# Patient Record
Sex: FEMALE | Race: OTHER | HISPANIC OR LATINO
[De-identification: names, ages, dates, MRNs, and addresses within clinical notes are randomized per-mention and may not be internally consistent; named-entity substitution may affect disease eponyms.]

---

## 2017-01-04 ENCOUNTER — APPOINTMENT (OUTPATIENT)
Dept: HEART AND VASCULAR | Facility: CLINIC | Age: 47
End: 2017-01-04

## 2020-12-04 VITALS
TEMPERATURE: 98 F | HEIGHT: 60 IN | DIASTOLIC BLOOD PRESSURE: 80 MMHG | HEART RATE: 89 BPM | RESPIRATION RATE: 16 BRPM | WEIGHT: 167.33 LBS | OXYGEN SATURATION: 98 % | SYSTOLIC BLOOD PRESSURE: 116 MMHG

## 2020-12-06 ENCOUNTER — TRANSCRIPTION ENCOUNTER (OUTPATIENT)
Age: 50
End: 2020-12-06

## 2020-12-06 RX ORDER — POVIDONE-IODINE 5 %
1 AEROSOL (ML) TOPICAL ONCE
Refills: 0 | Status: COMPLETED | OUTPATIENT
Start: 2020-12-07 | End: 2020-12-07

## 2020-12-07 ENCOUNTER — INPATIENT (INPATIENT)
Facility: HOSPITAL | Age: 50
LOS: 4 days | Discharge: HOME | DRG: 454 | End: 2020-12-12
Attending: NEUROLOGICAL SURGERY | Admitting: NEUROLOGICAL SURGERY
Payer: COMMERCIAL

## 2020-12-07 DIAGNOSIS — Z41.9 ENCOUNTER FOR PROCEDURE FOR PURPOSES OTHER THAN REMEDYING HEALTH STATE, UNSPECIFIED: Chronic | ICD-10-CM

## 2020-12-07 DIAGNOSIS — M54.12 RADICULOPATHY, CERVICAL REGION: ICD-10-CM

## 2020-12-07 LAB
ANION GAP SERPL CALC-SCNC: 11 MMOL/L — SIGNIFICANT CHANGE UP (ref 5–17)
BLD GP AB SCN SERPL QL: NEGATIVE — SIGNIFICANT CHANGE UP
BUN SERPL-MCNC: 10 MG/DL — SIGNIFICANT CHANGE UP (ref 7–23)
CALCIUM SERPL-MCNC: 8.8 MG/DL — SIGNIFICANT CHANGE UP (ref 8.4–10.5)
CHLORIDE SERPL-SCNC: 102 MMOL/L — SIGNIFICANT CHANGE UP (ref 96–108)
CO2 SERPL-SCNC: 23 MMOL/L — SIGNIFICANT CHANGE UP (ref 22–31)
CREAT SERPL-MCNC: 0.65 MG/DL — SIGNIFICANT CHANGE UP (ref 0.5–1.3)
GLUCOSE SERPL-MCNC: 188 MG/DL — HIGH (ref 70–99)
HCG SERPL-ACNC: 4 MIU/ML — SIGNIFICANT CHANGE UP
POTASSIUM SERPL-MCNC: 4 MMOL/L — SIGNIFICANT CHANGE UP (ref 3.5–5.3)
POTASSIUM SERPL-SCNC: 4 MMOL/L — SIGNIFICANT CHANGE UP (ref 3.5–5.3)
RH IG SCN BLD-IMP: POSITIVE — SIGNIFICANT CHANGE UP
SODIUM SERPL-SCNC: 136 MMOL/L — SIGNIFICANT CHANGE UP (ref 135–145)

## 2020-12-07 PROCEDURE — 99024 POSTOP FOLLOW-UP VISIT: CPT

## 2020-12-07 RX ORDER — POTASSIUM CHLORIDE 20 MEQ
15 PACKET (EA) ORAL
Qty: 0 | Refills: 0 | DISCHARGE

## 2020-12-07 RX ORDER — OXYCODONE HYDROCHLORIDE 5 MG/1
5 TABLET ORAL EVERY 4 HOURS
Refills: 0 | Status: DISCONTINUED | OUTPATIENT
Start: 2020-12-07 | End: 2020-12-08

## 2020-12-07 RX ORDER — AMLODIPINE BESYLATE 2.5 MG/1
5 TABLET ORAL DAILY
Refills: 0 | Status: DISCONTINUED | OUTPATIENT
Start: 2020-12-07 | End: 2020-12-09

## 2020-12-07 RX ORDER — PROPOFOL 10 MG/ML
150 INJECTION, EMULSION INTRAVENOUS
Qty: 1000 | Refills: 0 | Status: DISCONTINUED | OUTPATIENT
Start: 2020-12-07 | End: 2020-12-07

## 2020-12-07 RX ORDER — ACETAMINOPHEN 500 MG
1000 TABLET ORAL EVERY 8 HOURS
Refills: 0 | Status: DISCONTINUED | OUTPATIENT
Start: 2020-12-07 | End: 2020-12-09

## 2020-12-07 RX ORDER — APREPITANT 80 MG/1
40 CAPSULE ORAL ONCE
Refills: 0 | Status: COMPLETED | OUTPATIENT
Start: 2020-12-07 | End: 2020-12-07

## 2020-12-07 RX ORDER — CHLORHEXIDINE GLUCONATE 213 G/1000ML
1 SOLUTION TOPICAL ONCE
Refills: 0 | Status: DISCONTINUED | OUTPATIENT
Start: 2020-12-07 | End: 2020-12-07

## 2020-12-07 RX ORDER — AMLODIPINE BESYLATE 2.5 MG/1
1 TABLET ORAL
Qty: 0 | Refills: 0 | DISCHARGE

## 2020-12-07 RX ORDER — SODIUM CHLORIDE 9 MG/ML
1000 INJECTION INTRAMUSCULAR; INTRAVENOUS; SUBCUTANEOUS
Refills: 0 | Status: DISCONTINUED | OUTPATIENT
Start: 2020-12-07 | End: 2020-12-08

## 2020-12-07 RX ORDER — BUPIVACAINE 13.3 MG/ML
20 INJECTION, SUSPENSION, LIPOSOMAL INFILTRATION ONCE
Refills: 0 | Status: DISCONTINUED | OUTPATIENT
Start: 2020-12-07 | End: 2020-12-09

## 2020-12-07 RX ORDER — ALBUTEROL 90 UG/1
2 AEROSOL, METERED ORAL
Qty: 0 | Refills: 0 | DISCHARGE

## 2020-12-07 RX ORDER — METOCLOPRAMIDE HCL 10 MG
10 TABLET ORAL EVERY 8 HOURS
Refills: 0 | Status: DISCONTINUED | OUTPATIENT
Start: 2020-12-07 | End: 2020-12-09

## 2020-12-07 RX ORDER — FLUTICASONE PROPIONATE AND SALMETEROL 50; 250 UG/1; UG/1
1 POWDER ORAL; RESPIRATORY (INHALATION)
Qty: 0 | Refills: 0 | DISCHARGE

## 2020-12-07 RX ORDER — HYDROMORPHONE HYDROCHLORIDE 2 MG/ML
0.5 INJECTION INTRAMUSCULAR; INTRAVENOUS; SUBCUTANEOUS
Refills: 0 | Status: DISCONTINUED | OUTPATIENT
Start: 2020-12-07 | End: 2020-12-07

## 2020-12-07 RX ORDER — GABAPENTIN 400 MG/1
300 CAPSULE ORAL ONCE
Refills: 0 | Status: COMPLETED | OUTPATIENT
Start: 2020-12-07 | End: 2020-12-07

## 2020-12-07 RX ORDER — ACETAMINOPHEN 500 MG
1000 TABLET ORAL ONCE
Refills: 0 | Status: COMPLETED | OUTPATIENT
Start: 2020-12-07 | End: 2020-12-07

## 2020-12-07 RX ORDER — METHOCARBAMOL 500 MG/1
500 TABLET, FILM COATED ORAL EVERY 8 HOURS
Refills: 0 | Status: DISCONTINUED | OUTPATIENT
Start: 2020-12-07 | End: 2020-12-09

## 2020-12-07 RX ORDER — HYDROMORPHONE HYDROCHLORIDE 2 MG/ML
0.5 INJECTION INTRAMUSCULAR; INTRAVENOUS; SUBCUTANEOUS
Refills: 0 | Status: DISCONTINUED | OUTPATIENT
Start: 2020-12-07 | End: 2020-12-09

## 2020-12-07 RX ORDER — ONDANSETRON 8 MG/1
4 TABLET, FILM COATED ORAL EVERY 6 HOURS
Refills: 0 | Status: DISCONTINUED | OUTPATIENT
Start: 2020-12-07 | End: 2020-12-09

## 2020-12-07 RX ORDER — SENNA PLUS 8.6 MG/1
2 TABLET ORAL AT BEDTIME
Refills: 0 | Status: DISCONTINUED | OUTPATIENT
Start: 2020-12-07 | End: 2020-12-09

## 2020-12-07 RX ORDER — ENOXAPARIN SODIUM 100 MG/ML
40 INJECTION SUBCUTANEOUS AT BEDTIME
Refills: 0 | Status: DISCONTINUED | OUTPATIENT
Start: 2020-12-08 | End: 2020-12-09

## 2020-12-07 RX ORDER — CELECOXIB 200 MG/1
200 CAPSULE ORAL ONCE
Refills: 0 | Status: COMPLETED | OUTPATIENT
Start: 2020-12-07 | End: 2020-12-07

## 2020-12-07 RX ORDER — ROSUVASTATIN CALCIUM 5 MG/1
1 TABLET ORAL
Qty: 0 | Refills: 0 | DISCHARGE

## 2020-12-07 RX ORDER — CEFAZOLIN SODIUM 1 G
2000 VIAL (EA) INJECTION EVERY 8 HOURS
Refills: 0 | Status: COMPLETED | OUTPATIENT
Start: 2020-12-07 | End: 2020-12-08

## 2020-12-07 RX ORDER — ATORVASTATIN CALCIUM 80 MG/1
20 TABLET, FILM COATED ORAL AT BEDTIME
Refills: 0 | Status: DISCONTINUED | OUTPATIENT
Start: 2020-12-07 | End: 2020-12-09

## 2020-12-07 RX ADMIN — Medication 1 APPLICATION(S): at 08:25

## 2020-12-07 RX ADMIN — HYDROMORPHONE HYDROCHLORIDE 0.5 MILLIGRAM(S): 2 INJECTION INTRAMUSCULAR; INTRAVENOUS; SUBCUTANEOUS at 17:40

## 2020-12-07 RX ADMIN — HYDROMORPHONE HYDROCHLORIDE 0.5 MILLIGRAM(S): 2 INJECTION INTRAMUSCULAR; INTRAVENOUS; SUBCUTANEOUS at 16:34

## 2020-12-07 RX ADMIN — ATORVASTATIN CALCIUM 20 MILLIGRAM(S): 80 TABLET, FILM COATED ORAL at 21:19

## 2020-12-07 RX ADMIN — Medication 1000 MILLIGRAM(S): at 08:26

## 2020-12-07 RX ADMIN — Medication 1000 MILLIGRAM(S): at 22:26

## 2020-12-07 RX ADMIN — Medication 1000 MILLIGRAM(S): at 22:00

## 2020-12-07 RX ADMIN — ONDANSETRON 4 MILLIGRAM(S): 8 TABLET, FILM COATED ORAL at 21:00

## 2020-12-07 RX ADMIN — HYDROMORPHONE HYDROCHLORIDE 0.5 MILLIGRAM(S): 2 INJECTION INTRAMUSCULAR; INTRAVENOUS; SUBCUTANEOUS at 16:30

## 2020-12-07 RX ADMIN — Medication 50 MILLIGRAM(S): at 22:23

## 2020-12-07 RX ADMIN — SODIUM CHLORIDE 100 MILLILITER(S): 9 INJECTION INTRAMUSCULAR; INTRAVENOUS; SUBCUTANEOUS at 23:32

## 2020-12-07 RX ADMIN — METHOCARBAMOL 500 MILLIGRAM(S): 500 TABLET, FILM COATED ORAL at 22:23

## 2020-12-07 RX ADMIN — SENNA PLUS 2 TABLET(S): 8.6 TABLET ORAL at 21:19

## 2020-12-07 RX ADMIN — CELECOXIB 200 MILLIGRAM(S): 200 CAPSULE ORAL at 08:56

## 2020-12-07 RX ADMIN — HYDROMORPHONE HYDROCHLORIDE 0.5 MILLIGRAM(S): 2 INJECTION INTRAMUSCULAR; INTRAVENOUS; SUBCUTANEOUS at 23:29

## 2020-12-07 RX ADMIN — HYDROMORPHONE HYDROCHLORIDE 0.5 MILLIGRAM(S): 2 INJECTION INTRAMUSCULAR; INTRAVENOUS; SUBCUTANEOUS at 21:25

## 2020-12-07 RX ADMIN — HYDROMORPHONE HYDROCHLORIDE 0.5 MILLIGRAM(S): 2 INJECTION INTRAMUSCULAR; INTRAVENOUS; SUBCUTANEOUS at 21:45

## 2020-12-07 RX ADMIN — Medication 100 MILLIGRAM(S): at 16:39

## 2020-12-07 RX ADMIN — CELECOXIB 200 MILLIGRAM(S): 200 CAPSULE ORAL at 08:26

## 2020-12-07 RX ADMIN — Medication 1000 MILLIGRAM(S): at 08:56

## 2020-12-07 RX ADMIN — HYDROMORPHONE HYDROCHLORIDE 0.5 MILLIGRAM(S): 2 INJECTION INTRAMUSCULAR; INTRAVENOUS; SUBCUTANEOUS at 16:03

## 2020-12-07 RX ADMIN — GABAPENTIN 300 MILLIGRAM(S): 400 CAPSULE ORAL at 08:27

## 2020-12-07 RX ADMIN — HYDROMORPHONE HYDROCHLORIDE 0.5 MILLIGRAM(S): 2 INJECTION INTRAMUSCULAR; INTRAVENOUS; SUBCUTANEOUS at 20:14

## 2020-12-07 RX ADMIN — APREPITANT 40 MILLIGRAM(S): 80 CAPSULE ORAL at 08:26

## 2020-12-07 NOTE — H&P ADULT - NSICDXPASTMEDICALHX_GEN_ALL_CORE_FT
PAST MEDICAL HISTORY:  Asthma     Cervical radiculopathy     HLD (hyperlipidemia)     HTN (hypertension)

## 2020-12-07 NOTE — H&P ADULT - NSHPREVIEWOFSYSTEMS_GEN_ALL_CORE
REVIEW OF SYSTEMS      General:	no recent illnesses, no recent wt gain/loss    Skin/Breast:  intact  	  Ophthalmologic:  negative  	  ENMT:	negative    Respiratory and Thorax: no coughing, wheezing, recent URI  	  Cardiovascular: no chest pain, LINDQUIST    Gastrointestinal:	soft, non tender    Genitourinary: no frequency, dysuria    Musculoskeletal:	negative    Neurological:	see HPI    Psychiatric:	negative    Hematology/Lymphatics:	negative    Endocrine:  	negative    Allergic/Immunologic:  Negative

## 2020-12-07 NOTE — H&P ADULT - NSHPPHYSICALEXAM_GEN_ALL_CORE
Able to raise both arms overhead but 15 degree lag in left shoulder  4/5 strength throughout left arm deltoid bicep triceps  and intrinsics. full strength right arm.  2+ right 1+ left brachioradialis, trace bilateral triceps reflexes  1+ bilateral patellar  torre negative  tinel negative  clonus negative  spurling uncomfortable  no shoulder impingement signs

## 2020-12-07 NOTE — H&P ADULT - PROBLEM SELECTOR PLAN 1
Surgery as described above and consented Surgery as described above and consented    12/9/20 - revision surgery as consented. Dr. Fonseca will perform plastic closure as well.

## 2020-12-07 NOTE — PROGRESS NOTE ADULT - SUBJECTIVE AND OBJECTIVE BOX
NEUROSURGERY POST OP NOTE:    POD# 0 S/P C6-T1 ACDF, C3-4 Laminectomy, C3-T1 Posterior fusion    S: Pt seen and examined at beside. Patient reports left-sided neck pain, radiating to left arm with paresthesias similar to preop. Pt denies acute worsening weakness of extremities.      T(C): 36.6 (12-07-20 @ 15:36), Max: 36.6 (12-07-20 @ 15:36)  HR: 91 (12-07-20 @ 15:36) (91 - 91)  BP: 114/63 (12-07-20 @ 15:36) (114/63 - 114/63)  RR: --  SpO2: 100% (12-07-20 @ 15:36) (100% - 100%)    acetaminophen   Tablet .. 1000 milliGRAM(s) Oral every 8 hours  amLODIPine   Tablet 5 milliGRAM(s) Oral daily  atorvastatin 20 milliGRAM(s) Oral at bedtime  BUpivacaine liposome 1.3% Injectable (no eMAR) 20 milliLiter(s) Local Injection once  ceFAZolin   IVPB 2000 milliGRAM(s) IV Intermittent every 8 hours  HYDROmorphone  Injectable 0.5 milliGRAM(s) IV Push every 15 minutes PRN  methocarbamol 500 milliGRAM(s) Oral every 8 hours  metoclopramide Injectable 10 milliGRAM(s) IV Push every 8 hours PRN  ondansetron Injectable 4 milliGRAM(s) IV Push every 6 hours  oxyCODONE    IR 5 milliGRAM(s) Oral every 4 hours PRN  pregabalin 50 milliGRAM(s) Oral three times a day  senna 2 Tablet(s) Oral at bedtime  sodium chloride 0.9%. 1000 milliLiter(s) IV Continuous <Continuous>    Exam:  Neuro: AAOx3, FC, speech coherent  CNII-XII: EOM intact, PERRL, face symmetric  Motor: RUE/RLE/LLE 5/5 throughout, LUE 4-/5 throughout and HG 4-/5 (baseline,) decreased sensation to light touch throughout LUE (baseline)  Abdomen: Soft, NT/ND, BSx4  Cardiac: RRR, +s1/s2  Pulm: CTA B/L    WOUND/DRAINS:  anterior and posterior cervical incision sites C/D/I, dressing in place  +left deep hemovac    Assessment: 50yFemale with PMHx sig for HLD, HTN, asthma, cervical radiculopathy s/p C6-T1 ACDF, C3-4 Laminectomy, C3-T1 Posterior fusion    Plan:  -Neuro/spinal checks  -Pain control, ERAS postop: lyrica, tylenol, oxycodone, robaxin  -DVT prophylaxis: SCDs for now  -Postop ancef  -IVF while NPO, ADAT  -F/u AM labs  -Bowel regimen  -Remove seymour tomorrow morning  -PT/OOB  -encourage incentive spirometer use  -D/w Dr. Garrido

## 2020-12-07 NOTE — CHART NOTE - NSCHARTNOTEFT_GEN_A_CORE
Neurosurgical Indications for Screening Dopplers on Admission:       1) Known hypercoagulation disorder (h/o VTE, thrombophilia, HIT, etc.)   2) Admitted from prolonged stay from another institution (straight forward ER transfers not included)  3) Presenting with significant leg immobility   4) Presenting with signs and symptoms of VTE?    5) With significant critical illness, Including "found down" for unknown period of time in HPI  6) With significant neurotrauma (TBI, SCI / TLS spine fractures)   7) Who are comatose   8) With known malignancy (e.g. glioblastoma multiforme, meningioma, etc.). Excludes [place] chemo 23hr observation stays  9) On hemodialysis   10) Who have received platelet transfusion or antithrombotic reversal agents recently   11) Who have had recent major orthopedic surgery          Screening dopplers indicated?   Y _   N X    DVT Prophylaxis:  _ SCD's   X chemoprophylaxis    Lovenox 40mg sq bedtime POD1

## 2020-12-07 NOTE — H&P ADULT - HISTORY OF PRESENT ILLNESS
Patient presents with chronic neck pain that causes headache and migraine type symptoms, pain in both shoulder blades and then pain radiating through the left shoulder arm and down the hand with sense of weakness in left hand, history of C5-6 ACDF and C4-5 acdf 2017. Patient presents with chronic neck pain that causes headache and migraine type symptoms, pain in both shoulder blades and then pain radiating through the left shoulder arm and down the hand with sense of weakness in left hand, history of C5-6 ACDF and C4-5 acdf 2017.    Update: 12/9/2020.     patient complains of persistent increased left arm pain s/p C6-T1 ACDF, C3-4 Laminectomy, C3-T1 Posterior Fusion 12/7/20. It has been decided that she will be brought back to OR for exploration of fusion and reinstrumentation.

## 2020-12-08 ENCOUNTER — TRANSCRIPTION ENCOUNTER (OUTPATIENT)
Age: 50
End: 2020-12-08

## 2020-12-08 LAB
ANION GAP SERPL CALC-SCNC: 10 MMOL/L — SIGNIFICANT CHANGE UP (ref 5–17)
BUN SERPL-MCNC: 12 MG/DL — SIGNIFICANT CHANGE UP (ref 7–23)
CALCIUM SERPL-MCNC: 8.5 MG/DL — SIGNIFICANT CHANGE UP (ref 8.4–10.5)
CHLORIDE SERPL-SCNC: 104 MMOL/L — SIGNIFICANT CHANGE UP (ref 96–108)
CO2 SERPL-SCNC: 23 MMOL/L — SIGNIFICANT CHANGE UP (ref 22–31)
CREAT SERPL-MCNC: 0.69 MG/DL — SIGNIFICANT CHANGE UP (ref 0.5–1.3)
GLUCOSE SERPL-MCNC: 109 MG/DL — HIGH (ref 70–99)
HCT VFR BLD CALC: 33.3 % — LOW (ref 34.5–45)
HGB BLD-MCNC: 11.1 G/DL — LOW (ref 11.5–15.5)
MAGNESIUM SERPL-MCNC: 1.9 MG/DL — SIGNIFICANT CHANGE UP (ref 1.6–2.6)
MCHC RBC-ENTMCNC: 28.8 PG — SIGNIFICANT CHANGE UP (ref 27–34)
MCHC RBC-ENTMCNC: 33.3 GM/DL — SIGNIFICANT CHANGE UP (ref 32–36)
MCV RBC AUTO: 86.3 FL — SIGNIFICANT CHANGE UP (ref 80–100)
NRBC # BLD: 0 /100 WBCS — SIGNIFICANT CHANGE UP (ref 0–0)
PHOSPHATE SERPL-MCNC: 4.9 MG/DL — HIGH (ref 2.5–4.5)
PLATELET # BLD AUTO: 190 K/UL — SIGNIFICANT CHANGE UP (ref 150–400)
POTASSIUM SERPL-MCNC: 4.2 MMOL/L — SIGNIFICANT CHANGE UP (ref 3.5–5.3)
POTASSIUM SERPL-SCNC: 4.2 MMOL/L — SIGNIFICANT CHANGE UP (ref 3.5–5.3)
RBC # BLD: 3.86 M/UL — SIGNIFICANT CHANGE UP (ref 3.8–5.2)
RBC # FLD: 13.2 % — SIGNIFICANT CHANGE UP (ref 10.3–14.5)
SODIUM SERPL-SCNC: 137 MMOL/L — SIGNIFICANT CHANGE UP (ref 135–145)
WBC # BLD: 19.29 K/UL — HIGH (ref 3.8–10.5)
WBC # FLD AUTO: 19.29 K/UL — HIGH (ref 3.8–10.5)

## 2020-12-08 PROCEDURE — 72125 CT NECK SPINE W/O DYE: CPT | Mod: 26

## 2020-12-08 RX ORDER — BUDESONIDE AND FORMOTEROL FUMARATE DIHYDRATE 160; 4.5 UG/1; UG/1
2 AEROSOL RESPIRATORY (INHALATION)
Refills: 0 | Status: DISCONTINUED | OUTPATIENT
Start: 2020-12-08 | End: 2020-12-09

## 2020-12-08 RX ORDER — ALBUTEROL 90 UG/1
2 AEROSOL, METERED ORAL EVERY 6 HOURS
Refills: 0 | Status: DISCONTINUED | OUTPATIENT
Start: 2020-12-08 | End: 2020-12-09

## 2020-12-08 RX ORDER — OXYCODONE HYDROCHLORIDE 5 MG/1
10 TABLET ORAL EVERY 4 HOURS
Refills: 0 | Status: DISCONTINUED | OUTPATIENT
Start: 2020-12-08 | End: 2020-12-09

## 2020-12-08 RX ADMIN — HYDROMORPHONE HYDROCHLORIDE 0.5 MILLIGRAM(S): 2 INJECTION INTRAMUSCULAR; INTRAVENOUS; SUBCUTANEOUS at 07:28

## 2020-12-08 RX ADMIN — HYDROMORPHONE HYDROCHLORIDE 0.5 MILLIGRAM(S): 2 INJECTION INTRAMUSCULAR; INTRAVENOUS; SUBCUTANEOUS at 08:46

## 2020-12-08 RX ADMIN — ONDANSETRON 4 MILLIGRAM(S): 8 TABLET, FILM COATED ORAL at 03:41

## 2020-12-08 RX ADMIN — Medication 1000 MILLIGRAM(S): at 14:52

## 2020-12-08 RX ADMIN — ATORVASTATIN CALCIUM 20 MILLIGRAM(S): 80 TABLET, FILM COATED ORAL at 21:22

## 2020-12-08 RX ADMIN — BUDESONIDE AND FORMOTEROL FUMARATE DIHYDRATE 2 PUFF(S): 160; 4.5 AEROSOL RESPIRATORY (INHALATION) at 09:40

## 2020-12-08 RX ADMIN — ENOXAPARIN SODIUM 40 MILLIGRAM(S): 100 INJECTION SUBCUTANEOUS at 21:22

## 2020-12-08 RX ADMIN — AMLODIPINE BESYLATE 5 MILLIGRAM(S): 2.5 TABLET ORAL at 05:36

## 2020-12-08 RX ADMIN — Medication 1000 MILLIGRAM(S): at 05:36

## 2020-12-08 RX ADMIN — Medication 100 MILLIGRAM(S): at 00:23

## 2020-12-08 RX ADMIN — Medication 1000 MILLIGRAM(S): at 22:22

## 2020-12-08 RX ADMIN — METHOCARBAMOL 500 MILLIGRAM(S): 500 TABLET, FILM COATED ORAL at 05:36

## 2020-12-08 RX ADMIN — OXYCODONE HYDROCHLORIDE 10 MILLIGRAM(S): 5 TABLET ORAL at 09:30

## 2020-12-08 RX ADMIN — ALBUTEROL 2 PUFF(S): 90 AEROSOL, METERED ORAL at 09:39

## 2020-12-08 RX ADMIN — Medication 50 MILLIGRAM(S): at 05:37

## 2020-12-08 RX ADMIN — BUDESONIDE AND FORMOTEROL FUMARATE DIHYDRATE 2 PUFF(S): 160; 4.5 AEROSOL RESPIRATORY (INHALATION) at 17:57

## 2020-12-08 RX ADMIN — OXYCODONE HYDROCHLORIDE 10 MILLIGRAM(S): 5 TABLET ORAL at 17:47

## 2020-12-08 RX ADMIN — Medication 1000 MILLIGRAM(S): at 21:22

## 2020-12-08 RX ADMIN — METHOCARBAMOL 500 MILLIGRAM(S): 500 TABLET, FILM COATED ORAL at 13:34

## 2020-12-08 RX ADMIN — SENNA PLUS 2 TABLET(S): 8.6 TABLET ORAL at 21:22

## 2020-12-08 RX ADMIN — METHOCARBAMOL 500 MILLIGRAM(S): 500 TABLET, FILM COATED ORAL at 23:38

## 2020-12-08 RX ADMIN — Medication 1000 MILLIGRAM(S): at 13:34

## 2020-12-08 RX ADMIN — OXYCODONE HYDROCHLORIDE 10 MILLIGRAM(S): 5 TABLET ORAL at 10:13

## 2020-12-08 RX ADMIN — HYDROMORPHONE HYDROCHLORIDE 0.5 MILLIGRAM(S): 2 INJECTION INTRAMUSCULAR; INTRAVENOUS; SUBCUTANEOUS at 00:08

## 2020-12-08 RX ADMIN — Medication 1000 MILLIGRAM(S): at 05:30

## 2020-12-08 RX ADMIN — Medication 75 MILLIGRAM(S): at 21:22

## 2020-12-08 RX ADMIN — Medication 75 MILLIGRAM(S): at 13:33

## 2020-12-08 RX ADMIN — OXYCODONE HYDROCHLORIDE 10 MILLIGRAM(S): 5 TABLET ORAL at 16:40

## 2020-12-08 NOTE — OCCUPATIONAL THERAPY INITIAL EVALUATION ADULT - PERTINENT HX OF CURRENT PROBLEM, REHAB EVAL
Patient presents with chronic neck pain that causes headache and migraine type symptoms, pain in both shoulder blades and then pain radiating through the left shoulder arm and down the hand with sense of weakness in left hand, history of C5-6 ACDF and C4-5 acdf 2017.

## 2020-12-08 NOTE — PHYSICAL THERAPY INITIAL EVALUATION ADULT - CRITERIA FOR SKILLED THERAPEUTIC INTERVENTIONS
rehab potential/predicted duration of therapy intervention/anticipated equipment needs at discharge/functional limitations in following categories/anticipated discharge recommendation/impairments found/therapy frequency

## 2020-12-08 NOTE — OCCUPATIONAL THERAPY INITIAL EVALUATION ADULT - GENERAL OBSERVATIONS, REHAB EVAL
Spoke with RN Minda prior to session. Pt received semi-supine in bed in NAD, +[place], +medina.

## 2020-12-08 NOTE — PROGRESS NOTE ADULT - SUBJECTIVE AND OBJECTIVE BOX
HPI:  Patient presents with chronic neck pain that causes headache and migraine type symptoms, pain in both shoulder blades and then pain radiating through the left shoulder arm and down the hand with sense of weakness in left hand, history of C5-6 ACDF and C4-5 acdf 2017. (07 Dec 2020 16:33)    OVERNIGHT EVENTS:    Hospital Course:  POD# 0 S/P C6-T1 ACDF, C3-4 Laminectomy, C3-T1 Posterior fusion. Complaints of left shoulder/arm pain with paresthesias.  POD#1: +[place] J collar. Pain Mgmt consult. PT evaluation.    Vital Signs Last 24 Hrs  T(C): 36.6 (08 Dec 2020 05:22), Max: 36.6 (07 Dec 2020 15:36)  T(F): 97.9 (08 Dec 2020 05:22), Max: 97.9 (07 Dec 2020 15:36)  HR: 83 (08 Dec 2020 05:22) (68 - 91)  BP: 109/59 (08 Dec 2020 05:22) (93/58 - 118/61)  BP(mean): 83 (07 Dec 2020 23:00) (71 - 84)  RR: 17 (08 Dec 2020 05:22) (11 - 19)  SpO2: 98% (08 Dec 2020 05:22) (93% - 100%)    I&O's Summary    07 Dec 2020 07:01  -  08 Dec 2020 05:35  --------------------------------------------------------  IN: 1330 mL / OUT: 1017 mL / NET: 313 mL        PHYSICAL EXAM:  Gen: NAD, AAOx3  HEENT: PERRL. EOMI.  Neck: Right anterior and midline posterior incisions C/D/I. +Hard collar. +posterior HMV.   Lungs: Clear b/l  Heart: S1, S2. NSR.  Abd: Soft, NT/ND. +BS  Exts: Pulses 2+ throughout  Neuro: CNs II-XII intact intact. Left UE 4-/5 should abduction, 4+/5 elbow flexion, 4/5 extension, 4/5 . Right UE and LEs 5/5 throughout. Sensation to LT decreased throughout left arm. 1+ hyporeflexic to b/l UEs. Speech clear. Following commands.    TUBES/LINES:  [x] Seymour  [] Lumbar Drain  [x] Wound Drains  [] Others      DIET:  [] NPO  [] Mechanical  [] Tube feeds    LABS:    12-07    136  |  102  |  10  ----------------------------<  188<H>  4.0   |  23  |  0.65    Ca    8.8      07 Dec 2020 17:21              CAPILLARY BLOOD GLUCOSE          Drug Levels: [] N/A    CSF Analysis: [] N/A      Allergies    niacin (Hives)    Intolerances      MEDICATIONS:  Antibiotics:    Neuro:  acetaminophen   Tablet .. 1000 milliGRAM(s) Oral every 8 hours  HYDROmorphone  Injectable 0.5 milliGRAM(s) IV Push every 3 hours PRN  methocarbamol 500 milliGRAM(s) Oral every 8 hours  metoclopramide Injectable 10 milliGRAM(s) IV Push every 8 hours PRN  ondansetron Injectable 4 milliGRAM(s) IV Push every 6 hours  oxyCODONE    IR 5 milliGRAM(s) Oral every 4 hours PRN  pregabalin 50 milliGRAM(s) Oral three times a day    Anticoagulation:  enoxaparin Injectable 40 milliGRAM(s) SubCutaneous at bedtime    OTHER:  amLODIPine   Tablet 5 milliGRAM(s) Oral daily  atorvastatin 20 milliGRAM(s) Oral at bedtime  BUpivacaine liposome 1.3% Injectable (no eMAR) 20 milliLiter(s) Local Injection once  senna 2 Tablet(s) Oral at bedtime    IVF:  sodium chloride 0.9%. 1000 milliLiter(s) IV Continuous <Continuous>    CULTURES:    RADIOLOGY & ADDITIONAL TESTS:      ASSESSMENT:  50y Female with PMH of HLD, HTN, asthma, cervical radiculopathy s/p C6-T1 ACDF, C3-4 Laminectomy, C3-T1 Posterior fusion    : CERVICALGIA    Handoff    MEWS Score    Asthma    Cervical radiculopathy    Back pain    HLD (hyperlipidemia)    HTN (hypertension)    Cervical radiculopathy    Laminectomy, spine, cervical, open    Fusion of cervical spine by combined anterior and posterior approaches    Anterior cervical discectomy and fusion (ACDF)    Surgery, elective    [id number]        PLAN:  NEURO:  Continue neuro checks,  Pain meds PRN, ERAS protocol with Dilaudid breakthrough,  Hard collar when OOB,  Monitor drain output,    CARDIOVASCULAR:  Normotensive BP goals,  Daily labs, electrolyte repletion PRN,  Continue home norvasc and statin medications    PULMONARY:  Saturating well on RA,  Incentive spirometry PRN  Continue Advair. Nebulizer PRN.    RENAL:  Plan to DC seymour for TOV when OOB    GI:  PO diet as tolerated, stop IVF,  Stool softeners    ID:  Afebrile, no antibiotics,    ENDO:  n/a    DVT PROPHYLAXIS:  SCDs, SQL to start tonight    DISPOSITION:  Continue telemetry monitoring,  PT/OT, and OOB as tolerated,  D/w Dr. Garrido    Assessment:  Present when checked    []  GCS  E   V  M     Heart Failure: []Acute, [] acute on chronic , []chronic  Heart Failure:  [] Diastolic (HFpEF), [] Systolic (HFrEF), []Combined (HFpEF and HFrEF), [] RHF, [] Pulm HTN, [] Other    [] CASTRO, [] ATN, [] AIN, [] other  [] CKD1, [] CKD2, [] CKD 3, [] CKD 4, [] CKD 5, []ESRD    Encephalopathy: [] Metabolic, [] Hepatic, [] toxic, [] Neurological, [] Other    Abnormal Nurtitional Status: [] malnurtition (see nutrition note), [ ]underweight: BMI < 19, [] morbid obesity: BMI >40, [] Cachexia    [] Sepsis  [] hypovolemic shock,[] cardiogenic shock, [] hemorrhagic shock, [] neuogenic shock  [] Acute Respiratory Failure  []Cerebral edema, [] Brain compression/ herniation,   [] Functional quadriplegia  [] Acute blood loss anemia  p

## 2020-12-08 NOTE — PHYSICAL THERAPY INITIAL EVALUATION ADULT - ADDITIONAL COMMENTS
Pt lives in a walk up apartment with family and 14 steps to enter. Pt has been independent prior to s w/o use of DME.

## 2020-12-08 NOTE — PHYSICAL THERAPY INITIAL EVALUATION ADULT - PLANNED THERAPY INTERVENTIONS, PT EVAL
bed mobility training/manual therapy techniques/transfer training/gait training/strengthening/balance training

## 2020-12-08 NOTE — PROGRESS NOTE ADULT - ATTENDING COMMENTS
Sitting up in bed, incisional pain moderate, new left shoulder/arm/hand ache  able to raise both arms fully overhead (some discomfort left shoulder)  strong resistance bilat delt bi tri , IP Q  tolerates swallow without difficulty    mobilize PT OT  hard collar when OOB  advance diet as tolerated  drain management

## 2020-12-08 NOTE — PROGRESS NOTE ADULT - SUBJECTIVE AND OBJECTIVE BOX
HPI:  Patient presents with chronic neck pain that causes headache and migraine type symptoms, pain in both shoulder blades and then pain radiating through the left shoulder arm and down the hand with sense of weakness in left hand, history of C5-6 ACDF and C4-5 acdf 2017. (07 Dec 2020 16:33)    OVERNIGHT EVENTS:    Hospital Course:  POD# 0 S/P C6-T1 ACDF, C3-4 Laminectomy, C3-T1 Posterior fusion. Complaints of left shoulder/arm pain with paresthesias.  POD#1: +[place] J collar. Pain Mgmt consult. PT evaluation. Patient complaining of moderate pain.      Vital Signs Last 24 Hrs  T(C): 36.6 (08 Dec 2020 05:22), Max: 36.6 (07 Dec 2020 15:36)  T(F): 97.9 (08 Dec 2020 05:22), Max: 97.9 (07 Dec 2020 15:36)  HR: 83 (08 Dec 2020 05:22) (68 - 91)  BP: 109/59 (08 Dec 2020 05:22) (93/58 - 118/61)  BP(mean): 83 (07 Dec 2020 23:00) (71 - 84)  RR: 17 (08 Dec 2020 05:22) (11 - 19)  SpO2: 98% (08 Dec 2020 05:22) (93% - 100%)      PHYSICAL EXAM:  Gen: NAD, AAOx3, uncomfortable  HEENT: PERRL. EOMI.  Neck: Right anterior and midline posterior incisions C/D/I. +Hard collar. +posterior HMV.   Lungs: Clear b/l  Heart: S1, S2. NSR.  Abd: Soft, NT/ND. +BS  Exts: Pulses 2+ throughout  Neuro: CNs II-XII intact intact. Left UE 4-/5 should abduction, 4+/5 elbow flexion, 4/5 extension, 4/5 . Right UE and LEs 5/5 throughout. Sensation to LT decreased throughout left arm. 1+ hyporeflexic to b/l UEs. Speech clear. Following commands.      Allergies    niacin (Hives)    Intolerances      MEDICATIONS  (STANDING):  acetaminophen   Tablet .. 1000 milliGRAM(s) Oral every 8 hours  amLODIPine   Tablet 5 milliGRAM(s) Oral daily  atorvastatin 20 milliGRAM(s) Oral at bedtime  budesonide 160 MICROgram(s)/formoterol 4.5 MICROgram(s) Inhaler 2 Puff(s) Inhalation two times a day  BUpivacaine liposome 1.3% Injectable (no eMAR) 20 milliLiter(s) Local Injection once  enoxaparin Injectable 40 milliGRAM(s) SubCutaneous at bedtime  methocarbamol 500 milliGRAM(s) Oral every 8 hours  ondansetron Injectable 4 milliGRAM(s) IV Push every 6 hours  pregabalin 50 milliGRAM(s) Oral three times a day  senna 2 Tablet(s) Oral at bedtime    MEDICATIONS  (PRN):  ALBUTerol    90 MICROgram(s) HFA Inhaler 2 Puff(s) Inhalation every 6 hours PRN Shortness of Breath and/or Wheezing  HYDROmorphone  Injectable 0.5 milliGRAM(s) IV Push every 3 hours PRN Breakthrough Pain  metoclopramide Injectable 10 milliGRAM(s) IV Push every 8 hours PRN Nausea  oxyCODONE    IR 5 milliGRAM(s) Oral every 4 hours PRN Severe Pain (7 - 10)      CULTURES:    RADIOLOGY & ADDITIONAL TESTS:      ASSESSMENT:  50y Female with PMH of HLD, HTN, asthma, cervical radiculopathy s/p C6-T1 ACDF, C3-4 Laminectomy, C3-T1 Posterior fusion  - Continue Acetominophen 1000 mg Po q8h  - Continue methocarbamol 500 mg Po q8h  - Increase Pregabalin to 75 mg Po q8h  - Add Oxycodone 10 mg Po q4h prn severe pain (patient was taking Percocet prior to hospital)   - Continue bowel regimen    Plan discussed with patient and team.  All questions answered,  Will continue to follow.

## 2020-12-09 LAB
ANION GAP SERPL CALC-SCNC: 10 MMOL/L — SIGNIFICANT CHANGE UP (ref 5–17)
BUN SERPL-MCNC: 13 MG/DL — SIGNIFICANT CHANGE UP (ref 7–23)
CALCIUM SERPL-MCNC: 9 MG/DL — SIGNIFICANT CHANGE UP (ref 8.4–10.5)
CHLORIDE SERPL-SCNC: 102 MMOL/L — SIGNIFICANT CHANGE UP (ref 96–108)
CO2 SERPL-SCNC: 26 MMOL/L — SIGNIFICANT CHANGE UP (ref 22–31)
CREAT SERPL-MCNC: 0.69 MG/DL — SIGNIFICANT CHANGE UP (ref 0.5–1.3)
GLUCOSE SERPL-MCNC: 94 MG/DL — SIGNIFICANT CHANGE UP (ref 70–99)
HCT VFR BLD CALC: 36.2 % — SIGNIFICANT CHANGE UP (ref 34.5–45)
HGB BLD-MCNC: 11.8 G/DL — SIGNIFICANT CHANGE UP (ref 11.5–15.5)
MAGNESIUM SERPL-MCNC: 2 MG/DL — SIGNIFICANT CHANGE UP (ref 1.6–2.6)
MCHC RBC-ENTMCNC: 29.1 PG — SIGNIFICANT CHANGE UP (ref 27–34)
MCHC RBC-ENTMCNC: 32.6 GM/DL — SIGNIFICANT CHANGE UP (ref 32–36)
MCV RBC AUTO: 89.4 FL — SIGNIFICANT CHANGE UP (ref 80–100)
MELD SCORE WITH DIALYSIS: 7 POINTS — SIGNIFICANT CHANGE UP
NRBC # BLD: 0 /100 WBCS — SIGNIFICANT CHANGE UP (ref 0–0)
PHOSPHATE SERPL-MCNC: 3 MG/DL — SIGNIFICANT CHANGE UP (ref 2.5–4.5)
PLATELET # BLD AUTO: 193 K/UL — SIGNIFICANT CHANGE UP (ref 150–400)
POTASSIUM SERPL-MCNC: 3.9 MMOL/L — SIGNIFICANT CHANGE UP (ref 3.5–5.3)
POTASSIUM SERPL-SCNC: 3.9 MMOL/L — SIGNIFICANT CHANGE UP (ref 3.5–5.3)
RBC # BLD: 4.05 M/UL — SIGNIFICANT CHANGE UP (ref 3.8–5.2)
RBC # FLD: 13.7 % — SIGNIFICANT CHANGE UP (ref 10.3–14.5)
SODIUM SERPL-SCNC: 138 MMOL/L — SIGNIFICANT CHANGE UP (ref 135–145)
WBC # BLD: 13.57 K/UL — HIGH (ref 3.8–10.5)
WBC # FLD AUTO: 13.57 K/UL — HIGH (ref 3.8–10.5)

## 2020-12-09 PROCEDURE — 99024 POSTOP FOLLOW-UP VISIT: CPT

## 2020-12-09 RX ORDER — ENOXAPARIN SODIUM 100 MG/ML
40 INJECTION SUBCUTANEOUS AT BEDTIME
Refills: 0 | Status: DISCONTINUED | OUTPATIENT
Start: 2020-12-10 | End: 2020-12-12

## 2020-12-09 RX ORDER — METHOCARBAMOL 500 MG/1
500 TABLET, FILM COATED ORAL EVERY 8 HOURS
Refills: 0 | Status: DISCONTINUED | OUTPATIENT
Start: 2020-12-09 | End: 2020-12-11

## 2020-12-09 RX ORDER — HYDROMORPHONE HYDROCHLORIDE 2 MG/ML
30 INJECTION INTRAMUSCULAR; INTRAVENOUS; SUBCUTANEOUS
Refills: 0 | Status: DISCONTINUED | OUTPATIENT
Start: 2020-12-09 | End: 2020-12-11

## 2020-12-09 RX ORDER — SODIUM CHLORIDE 9 MG/ML
1000 INJECTION, SOLUTION INTRAVENOUS
Refills: 0 | Status: DISCONTINUED | OUTPATIENT
Start: 2020-12-09 | End: 2020-12-09

## 2020-12-09 RX ORDER — NALOXONE HYDROCHLORIDE 4 MG/.1ML
0.1 SPRAY NASAL
Refills: 0 | Status: DISCONTINUED | OUTPATIENT
Start: 2020-12-09 | End: 2020-12-12

## 2020-12-09 RX ORDER — OXYCODONE HYDROCHLORIDE 5 MG/1
10 TABLET ORAL EVERY 12 HOURS
Refills: 0 | Status: DISCONTINUED | OUTPATIENT
Start: 2020-12-09 | End: 2020-12-09

## 2020-12-09 RX ORDER — SODIUM CHLORIDE 9 MG/ML
1000 INJECTION INTRAMUSCULAR; INTRAVENOUS; SUBCUTANEOUS
Refills: 0 | Status: DISCONTINUED | OUTPATIENT
Start: 2020-12-09 | End: 2020-12-09

## 2020-12-09 RX ORDER — POVIDONE-IODINE 5 %
1 AEROSOL (ML) TOPICAL ONCE
Refills: 0 | Status: COMPLETED | OUTPATIENT
Start: 2020-12-09 | End: 2020-12-09

## 2020-12-09 RX ORDER — CEFAZOLIN SODIUM 1 G
2000 VIAL (EA) INJECTION EVERY 8 HOURS
Refills: 0 | Status: COMPLETED | OUTPATIENT
Start: 2020-12-09 | End: 2020-12-10

## 2020-12-09 RX ORDER — ACETAMINOPHEN 500 MG
1000 TABLET ORAL EVERY 8 HOURS
Refills: 0 | Status: DISCONTINUED | OUTPATIENT
Start: 2020-12-09 | End: 2020-12-12

## 2020-12-09 RX ORDER — HYDROMORPHONE HYDROCHLORIDE 2 MG/ML
0.5 INJECTION INTRAMUSCULAR; INTRAVENOUS; SUBCUTANEOUS
Refills: 0 | Status: ACTIVE | OUTPATIENT
Start: 2020-12-09 | End: 2020-12-16

## 2020-12-09 RX ORDER — OXYCODONE HYDROCHLORIDE 5 MG/1
5 TABLET ORAL EVERY 4 HOURS
Refills: 0 | Status: ACTIVE | OUTPATIENT
Start: 2020-12-09 | End: 2020-12-16

## 2020-12-09 RX ORDER — OXYCODONE HYDROCHLORIDE 5 MG/1
10 TABLET ORAL
Refills: 0 | Status: DISCONTINUED | OUTPATIENT
Start: 2020-12-09 | End: 2020-12-09

## 2020-12-09 RX ORDER — METOCLOPRAMIDE HCL 10 MG
10 TABLET ORAL EVERY 8 HOURS
Refills: 0 | Status: DISCONTINUED | OUTPATIENT
Start: 2020-12-09 | End: 2020-12-12

## 2020-12-09 RX ORDER — ONDANSETRON 8 MG/1
4 TABLET, FILM COATED ORAL EVERY 6 HOURS
Refills: 0 | Status: DISCONTINUED | OUTPATIENT
Start: 2020-12-09 | End: 2020-12-12

## 2020-12-09 RX ORDER — SENNA PLUS 8.6 MG/1
2 TABLET ORAL AT BEDTIME
Refills: 0 | Status: DISCONTINUED | OUTPATIENT
Start: 2020-12-09 | End: 2020-12-12

## 2020-12-09 RX ADMIN — Medication 1000 MILLIGRAM(S): at 23:20

## 2020-12-09 RX ADMIN — Medication 1000 MILLIGRAM(S): at 08:01

## 2020-12-09 RX ADMIN — OXYCODONE HYDROCHLORIDE 10 MILLIGRAM(S): 5 TABLET ORAL at 11:36

## 2020-12-09 RX ADMIN — HYDROMORPHONE HYDROCHLORIDE 30 MILLILITER(S): 2 INJECTION INTRAMUSCULAR; INTRAVENOUS; SUBCUTANEOUS at 19:27

## 2020-12-09 RX ADMIN — OXYCODONE HYDROCHLORIDE 10 MILLIGRAM(S): 5 TABLET ORAL at 12:23

## 2020-12-09 RX ADMIN — METHOCARBAMOL 500 MILLIGRAM(S): 500 TABLET, FILM COATED ORAL at 22:25

## 2020-12-09 RX ADMIN — Medication 100 MILLIGRAM(S): at 22:25

## 2020-12-09 RX ADMIN — Medication 75 MILLIGRAM(S): at 07:03

## 2020-12-09 RX ADMIN — SENNA PLUS 2 TABLET(S): 8.6 TABLET ORAL at 22:25

## 2020-12-09 RX ADMIN — BUDESONIDE AND FORMOTEROL FUMARATE DIHYDRATE 2 PUFF(S): 160; 4.5 AEROSOL RESPIRATORY (INHALATION) at 07:03

## 2020-12-09 RX ADMIN — METHOCARBAMOL 500 MILLIGRAM(S): 500 TABLET, FILM COATED ORAL at 07:03

## 2020-12-09 RX ADMIN — Medication 1 APPLICATION(S): at 13:00

## 2020-12-09 RX ADMIN — Medication 1000 MILLIGRAM(S): at 22:25

## 2020-12-09 RX ADMIN — Medication 50 MILLIGRAM(S): at 22:25

## 2020-12-09 RX ADMIN — SODIUM CHLORIDE 75 MILLILITER(S): 9 INJECTION, SOLUTION INTRAVENOUS at 07:07

## 2020-12-09 RX ADMIN — Medication 1000 MILLIGRAM(S): at 07:03

## 2020-12-09 RX ADMIN — AMLODIPINE BESYLATE 5 MILLIGRAM(S): 2.5 TABLET ORAL at 07:02

## 2020-12-09 NOTE — DIETITIAN INITIAL EVALUATION ADULT. - OTHER INFO
50F with hx of asthma, HLD, HTN, with sx hx of C5-6 ACDF and C4-5 acdf 2017 admitting with chronic neck pain that causes headache and migraine type symptoms, pain in both shoulder blades and then pain radiating through the left shoulder arm and down the hand with sense of weakness in left hand now S/P C6-T1 ACDF, C3-4 Laminectomy, C3-T1 Posterior fusion 12/7. Plan for OR again today for Posterior cervical revision instrumentation.     On assessment, pt resting in bed. Currently NPO for OR today, but was noted to be on CLD and tolerated well (>50%). No noted n/v/d. Last BM 12/6. No abd distention. 5/10 anterior neck pain noted- well controlled with pain regime. Pt noted to have a good appetite PTA. Followed regular diet. UBW consistent with admission weight. NKFA. Edu provided on likely diet advancement s/p sx and importance of adequate PO intake with emphasis on lean protein. Skin: Surgical incision, tanisha score 20. GI: WDL. Please see nutrition recs below. 50F with hx of asthma, HLD, HTN, with sx hx of C5-6 ACDF and C4-5 acdf 2017 admitting with chronic neck pain that causes headache and migraine type symptoms, pain in both shoulder blades and then pain radiating through the left shoulder arm and down the hand with sense of weakness in left hand now S/P C6-T1 ACDF, C3-4 Laminectomy, C3-T1 Posterior fusion 12/7. Plan for OR again today for Posterior cervical revision instrumentation.     On assessment, pt resting in bed. Currently NPO for OR today, but was noted to be on CLD and tolerated well (>50%). No noted n/v/d. Last BM 12/6. No abd distention. 5/10 anterior neck pain noted- well controlled with pain regime. Pt noted to have a good appetite PTA. Followed regular diet. UBW consistent with admission weight. NKFA. Edu provided on likely diet advancement s/p sx and importance of adequate PO intake with emphasis on lean protein. Skin: Surgical incision, tanisha score 20. GI: WDL. Please see nutrition recs below. RD to follow

## 2020-12-09 NOTE — PROGRESS NOTE ADULT - SUBJECTIVE AND OBJECTIVE BOX
NEUROSURGERY POST OP NOTE:    POD# 0 S/P   Exploration and revisional posterior instrumentation of cervical spine with plastic surgery closure (Dr. Fonseca)      S:   Post-op patient complains of posterior cervical pain radiating into her LUE. Patient states that the pain is improved from pre-op. Patient denies any numbness, tingling, or new weakness.     T(C): 37.2 (12-09-20 @ 20:34), Max: 37.2 (12-09-20 @ 20:34)  HR: 83 (12-09-20 @ 20:34) (72 - 88)  BP: 126/64 (12-09-20 @ 20:34) (110/59 - 153/79)  RR: 16 (12-09-20 @ 20:34) (11 - 17)  SpO2: 97% (12-09-20 @ 20:34) (94% - 100%)      12-08-20 @ 07:01  -  12-09-20 @ 07:00  --------------------------------------------------------  IN: 1855 mL / OUT: 1500 mL / NET: 355 mL    12-09-20 @ 07:01  -  12-09-20 @ 21:51  --------------------------------------------------------  IN: 415 mL / OUT: 1512.5 mL / NET: -1097.5 mL        acetaminophen   Tablet .. 1000 milliGRAM(s) Oral every 8 hours  ceFAZolin   IVPB 2000 milliGRAM(s) IV Intermittent every 8 hours  HYDROmorphone  Injectable 0.5 milliGRAM(s) IV Push every 15 minutes PRN  HYDROmorphone PCA (1 mG/mL) 30 milliLiter(s) PCA Continuous PCA Continuous  methocarbamol 500 milliGRAM(s) Oral every 8 hours  metoclopramide Injectable 10 milliGRAM(s) IV Push every 8 hours PRN  naloxone Injectable 0.1 milliGRAM(s) IV Push every 3 minutes PRN  ondansetron Injectable 4 milliGRAM(s) IV Push every 6 hours PRN  ondansetron Injectable 4 milliGRAM(s) IV Push every 6 hours  oxyCODONE    IR 5 milliGRAM(s) Oral every 4 hours PRN  pregabalin 50 milliGRAM(s) Oral three times a day  senna 2 Tablet(s) Oral at bedtime  sodium chloride 0.9%. 1000 milliLiter(s) IV Continuous <Continuous>      RADIOLOGY:     Exam:  General: patient seen laying supine in bed in NAD  Neuro: AAOx3, FC, OE spontaneously, speech clear and fluent, tongue midline, face symmetric, no pronator drift, no torre's, no clonus, strength 5/5 b/l RUE and b/l LEs, LUE 4/5 throughout secondary to pain, SILT throughout  HEENT: PERRL, EOMI  Neck: [place] J collar in place  Cardiac: RRR, S1S2  Pulmonary: chest rise symmetric  Abdomen: soft, nontender, nondistended  Ext: warm, perfusing well        WOUND/DRAINS: anterior incision C/D/I, posterior incision with prevena in  place, deep HMV x 1, superficial JAYNE x 1          Assessment: 51 y/o female POD 2 s/p C6-T1 ACDF, C3-4 Laminectomy, C3-T1 Posterior fusion (12/7), post-op CT  demonstrating screw with increased length at C6-7.  Now POD 0 from exploration and revisional posterior instrumentation of cervical spine (12/9).    Plan:  - pain control, Dr. Hannon following, on PCA dilaudid  - neuro and vital checks  - JANYE to be managed by Plastic Surgery, Dr. Fonseca  - ADAT  - bowel regimen  - perioperative Ancef  - SCDs for DVT ppx  - telemetry status, full code, dispo pending      D/w Dr. Garrido    Assessment:  Present when checked    []  GCS  E   V  M     Heart Failure: []Acute, [] acute on chronic , []chronic  Heart Failure:  [] Diastolic (HFpEF), [] Systolic (HFrEF), []Combined (HFpEF and HFrEF), [] RHF, [] Pulm HTN, [] Other    [] CASTRO, [] ATN, [] AIN, [] other  [] CKD1, [] CKD2, [] CKD 3, [] CKD 4, [] CKD 5, []ESRD    Encephalopathy: [] Metabolic, [] Hepatic, [] toxic, [] Neurological, [] Other    Abnormal Nurtitional Status: [] malnurtition (see nutrition note), [ ]underweight: BMI < 19, [] morbid obesity: BMI >40, [] Cachexia    [] Sepsis  [] hypovolemic shock,[] cardiogenic shock, [] hemorrhagic shock, [] neuogenic shock  [] Acute Respiratory Failure  []Cerebral edema, [] Brain compression/ herniation,   [] Functional quadriplegia  [] Acute blood loss anemia

## 2020-12-09 NOTE — CHART NOTE - NSCHARTNOTEFT_GEN_A_CORE
Neurosurgical Indications for Screening Dopplers on Admission:       1) Known hypercoagulation disorder (h/o VTE, thrombophilia, HIT, etc.)   2) Admitted from prolonged stay from another institution (straight forward ER transfers not included)  3) Presenting with significant leg immobility   4) Presenting with signs and symptoms of VTE?    5) With significant critical illness, Including "found down" for unknown period of time in HPI  6) With significant neurotrauma (TBI, SCI / TLS spine fractures)   7) Who are comatose   8) With known malignancy (e.g. glioblastoma multiforme, meningioma, etc.). Excludes [place] chemo 23hr observation stays  9) On hemodialysis   10) Who have received platelet transfusion or antithrombotic reversal agents recently   11) Who have had recent major orthopedic surgery          Screening dopplers indicated?   Y _   N x    DVT Prophylaxis:  _ SCD's   _x chemoprophylaxis    Lovenox POD1 40mg sq

## 2020-12-09 NOTE — DIETITIAN INITIAL EVALUATION ADULT. - ADD RECOMMEND
1. NPO for OR >> Recommend advance to regular diet when feasible 2. Pain and bowel regime per team 3. Cont to monitor lytes and replete prn 4. RD diet edu reenforcement prn

## 2020-12-09 NOTE — PROGRESS NOTE ADULT - SUBJECTIVE AND OBJECTIVE BOX
HPI:  Patient presents with chronic neck pain that causes headache and migraine type symptoms, pain in both shoulder blades and then pain radiating through the left shoulder arm and down the hand with sense of weakness in left hand, history of C5-6 ACDF and C4-5 acdf 2017. (07 Dec 2020 16:33)    OVERNIGHT EVENTS:    Hospital Course:  POD# 0 S/P C6-T1 ACDF, C3-4 Laminectomy, C3-T1 Posterior fusion. Complaints of left shoulder/arm pain with paresthesias.  POD#1: +[place] [employer] [place] J collar. Pain Mgmt consult. PT evaluation. Patient complaining of moderate pain.  POD#2  Patient continues to complain of pain, for OR today    Vital Signs Last 24 Hrs  T(C): 36.9 (09 Dec 2020 06:59), Max: 37.1 (08 Dec 2020 20:50)  T(F): 98.4 (09 Dec 2020 06:59), Max: 98.8 (08 Dec 2020 20:50)  HR: 77 (09 Dec 2020 06:59) (72 - 86)  BP: 153/79 (09 Dec 2020 06:59) (110/59 - 153/79)  BP(mean): --  RR: 15 (09 Dec 2020 06:59) (14 - 17)  SpO2: 96% (09 Dec 2020 06:59) (94% - 96%)      PHYSICAL EXAM:  Gen: NAD, AAOx3, uncomfortable  HEENT: PERRL. EOMI.  Neck: Right anterior and midline posterior incisions C/D/I. +Hard collar. +posterior HMV.   Lungs: Clear b/l  Heart: S1, S2. NSR.  Abd: Soft, NT/ND. +BS  Exts: Pulses 2+ throughout  Neuro: CNs II-XII intact intact. Left UE 4-/5 should abduction, 4+/5 elbow flexion, 4/5 extension, 4/5 . Right UE and LEs 5/5 throughout. Sensation to LT decreased throughout left arm. 1+ hyporeflexic to b/l UEs. Speech clear. Following commands.      Allergies    niacin (Hives)    Intolerances    MEDICATIONS  (STANDING):  acetaminophen   Tablet .. 1000 milliGRAM(s) Oral every 8 hours  amLODIPine   Tablet 5 milliGRAM(s) Oral daily  atorvastatin 20 milliGRAM(s) Oral at bedtime  budesonide 160 MICROgram(s)/formoterol 4.5 MICROgram(s) Inhaler 2 Puff(s) Inhalation two times a day  BUpivacaine liposome 1.3% Injectable (no eMAR) 20 milliLiter(s) Local Injection once  enoxaparin Injectable 40 milliGRAM(s) SubCutaneous at bedtime  lactated ringers. 1000 milliLiter(s) (75 mL/Hr) IV Continuous <Continuous>  methocarbamol 500 milliGRAM(s) Oral every 8 hours  ondansetron Injectable 4 milliGRAM(s) IV Push every 6 hours  pregabalin 75 milliGRAM(s) Oral every 8 hours  senna 2 Tablet(s) Oral at bedtime    MEDICATIONS  (PRN):  ALBUTerol    90 MICROgram(s) HFA Inhaler 2 Puff(s) Inhalation every 6 hours PRN Shortness of Breath and/or Wheezing  HYDROmorphone  Injectable 0.5 milliGRAM(s) IV Push every 3 hours PRN Breakthrough Pain  metoclopramide Injectable 10 milliGRAM(s) IV Push every 8 hours PRN Nausea  oxyCODONE    IR 10 milliGRAM(s) Oral every 4 hours PRN Severe Pain (7 - 10)      CULTURES:    RADIOLOGY & ADDITIONAL TESTS:      ASSESSMENT:  50y Female with PMH of HLD, HTN, asthma, cervical radiculopathy s/p C6-T1 ACDF, C3-4 Laminectomy, C3-T1 Posterior fusion, patient for OR today  - Continue Acetaminophen 1000 mg Po q8h  - Continue methocarbamol 500 mg Po q8h  - Continue Pregabalin to 75 mg Po q8h  - Add Oxycodone Extended Release (oxycontin) 10 mg Po q12h  - Continue bowel regimen    Plan discussed with patient and team.  All questions answered,  Will continue to follow.

## 2020-12-09 NOTE — PROGRESS NOTE ADULT - SUBJECTIVE AND OBJECTIVE BOX
HPI:  Patient presents with chronic neck pain that causes headache and migraine type symptoms, pain in both shoulder blades and then pain radiating through the left shoulder arm and down the hand with sense of weakness in left hand, history of C5-6 ACDF and C4-5 acdf 2017. (07 Dec 2020 16:33)    OVERNIGHT EVENTS:    Hospital Course:  POD# 0 S/P C6-T1 ACDF, C3-4 Laminectomy, C3-T1 Posterior fusion. Complaints of left shoulder/arm pain with paresthesias.  POD#1: +[place] J collar. Pain Mgmt consult. PT evaluation.  POD#2. No events overnight. [place] J in place. Dr. Hannon following for pain. CT C-spine for LUE pain/weakness - to be reviewed with Dr. Garrido in the AM    Vital Signs Last 24 Hrs  T(C): 37.1 (08 Dec 2020 20:50), Max: 37.1 (08 Dec 2020 20:50)  T(F): 98.8 (08 Dec 2020 20:50), Max: 98.8 (08 Dec 2020 20:50)  HR: 77 (08 Dec 2020 20:50) (77 - 86)  BP: 114/73 (08 Dec 2020 20:50) (109/59 - 125/80)  BP(mean): --  RR: 17 (08 Dec 2020 20:50) (16 - 17)  SpO2: 95% (08 Dec 2020 20:50) (94% - 98%)    I&O's Detail    07 Dec 2020 07:01  -  08 Dec 2020 07:00  --------------------------------------------------------  IN:    IV PiggyBack: 50 mL    Oral Fluid: 180 mL    sodium chloride 0.9%: 1200 mL  Total IN: 1430 mL    OUT:    Drain (mL): 92 mL    Indwelling Catheter - Urethral (mL): 2335 mL  Total OUT: 2427 mL    Total NET: -997 mL      08 Dec 2020 07:01  -  09 Dec 2020 00:20  --------------------------------------------------------  IN:    Oral Fluid: 1630 mL  Total IN: 1630 mL    OUT:    Drain (mL): 0 mL    Voided (mL): 1100 mL  Total OUT: 1100 mL    Total NET: 530 mL        I&O's Summary    07 Dec 2020 07:01  -  08 Dec 2020 07:00  --------------------------------------------------------  IN: 1430 mL / OUT: 2427 mL / NET: -997 mL    08 Dec 2020 07:01  -  09 Dec 2020 00:20  --------------------------------------------------------  IN: 1630 mL / OUT: 1100 mL / NET: 530 mL        PHYSICAL EXAM:  Gen: NAD, AAOx3  HEENT: PERRL. EOMI.  Neck: Right anterior and midline posterior incisions C/D/I.  +[place] J  Lungs: Clear b/l  Heart: S1, S2. NSR.  Abd: Soft, NT/ND. +BS  Exts: Pulses 2+ throughout  Neuro: CNs II-XII intact intact. Left UE 4-/5 should abduction, 4+/5 elbow flexion, 4/5 extension, 4/5 . Right UE and LEs 5/5 throughout. Sensation to LT decreased throughout left arm. 1+ hyporeflexic to b/l UEs. Speech clear. Following commands.    TUBES/LINES:  [] CVC  [] A-line  [] Lumbar Drain  [] Ventriculostomy  [] Other    DIET:  [] NPO  [] Mechanical  [] Tube feeds    LABS:                        11.1   19.29 )-----------( 190      ( 08 Dec 2020 05:37 )             33.3     12-08    137  |  104  |  12  ----------------------------<  109<H>  4.2   |  23  |  0.69    Ca    8.5      08 Dec 2020 05:37  Phos  4.9     12-08  Mg     1.9     12-08              CAPILLARY BLOOD GLUCOSE          Drug Levels: [] N/A    CSF Analysis: [] N/A      Allergies    niacin (Hives)    Intolerances      MEDICATIONS:  Antibiotics:    Neuro:  acetaminophen   Tablet .. 1000 milliGRAM(s) Oral every 8 hours  HYDROmorphone  Injectable 0.5 milliGRAM(s) IV Push every 3 hours PRN  methocarbamol 500 milliGRAM(s) Oral every 8 hours  metoclopramide Injectable 10 milliGRAM(s) IV Push every 8 hours PRN  ondansetron Injectable 4 milliGRAM(s) IV Push every 6 hours  oxyCODONE    IR 10 milliGRAM(s) Oral every 4 hours PRN  pregabalin 75 milliGRAM(s) Oral every 8 hours    Anticoagulation:  enoxaparin Injectable 40 milliGRAM(s) SubCutaneous at bedtime    OTHER:  ALBUTerol    90 MICROgram(s) HFA Inhaler 2 Puff(s) Inhalation every 6 hours PRN  amLODIPine   Tablet 5 milliGRAM(s) Oral daily  atorvastatin 20 milliGRAM(s) Oral at bedtime  budesonide 160 MICROgram(s)/formoterol 4.5 MICROgram(s) Inhaler 2 Puff(s) Inhalation two times a day  BUpivacaine liposome 1.3% Injectable (no eMAR) 20 milliLiter(s) Local Injection once  senna 2 Tablet(s) Oral at bedtime    IVF:    CULTURES:    RADIOLOGY & ADDITIONAL TESTS:      ASSESSMENT:  50y Female with PMH of HLD, HTN, asthma, cervical radiculopathy s/p C6-T1 ACDF, C3-4 Laminectomy, C3-T1 Posterior fusion    : CERVICALGIA    Handoff    MEWS Score    Asthma    Cervical radiculopathy    Back pain    HLD (hyperlipidemia)    HTN (hypertension)    Cervical radiculopathy    Laminectomy, spine, cervical, open    Fusion of cervical spine by combined anterior and posterior approaches    Anterior cervical discectomy and fusion (ACDF)    Surgery, elective    [id number]        PLAN:  NEURO:  Continue neuro checks,  Pain meds PRN, ERAS protocol with Dilaudid breakthrough,  [place] J  Monitor drain output,    CARDIOVASCULAR:  Normotensive BP goals,  Daily labs, electrolyte repletion PRN,  Continue home norvasc and statin medications    PULMONARY:  Saturating well on RA,  Incentive spirometry PRN  Continue Advair. Nebulizer PRN.    RENAL:  Plan to DC [place] for TOV when OOB    GI:  PO diet as tolerated, stop IVF,  Stool softeners    ID:  Afebrile, no antibiotics,    ENDO:  n/a    DVT PROPHYLAXIS:  SCDs/SQL    DISPOSITION:  Continue telemetry monitoring,  PT/OT, and OOB as tolerated,  D/w Dr. Garrido    Assessment:  Present when checked    []  GCS  E   V  M     Heart Failure: []Acute, [] acute on chronic , []chronic  Heart Failure:  [] Diastolic (HFpEF), [] Systolic (HFrEF), []Combined (HFpEF and HFrEF), [] RHF, [] Pulm HTN, [] Other    [] CASTRO, [] ATN, [] AIN, [] other  [] CKD1, [] CKD2, [] CKD 3, [] CKD 4, [] CKD 5, []ESRD    Encephalopathy: [] Metabolic, [] Hepatic, [] toxic, [] Neurological, [] Other    Abnormal Nurtitional Status: [] malnurtition (see nutrition note), [ ]underweight: BMI < 19, [] morbid obesity: BMI >40, [] Cachexia    [] Sepsis  [] hypovolemic shock,[] cardiogenic shock, [] hemorrhagic shock, [] neuogenic shock  [] Acute Respiratory Failure  []Cerebral edema, [] Brain compression/ herniation,   [] Functional quadriplegia  [] Acute blood loss anemia

## 2020-12-09 NOTE — BRIEF OPERATIVE NOTE - OPERATION/FINDINGS
Exploration and revisional posterior instrumentation of cervical spine
C6-T1 ACDF, C3-4 Laminectomy, C3-T1 Posterior fusion.

## 2020-12-09 NOTE — BRIEF OPERATIVE NOTE - NSICDXBRIEFPROCEDURE_GEN_ALL_CORE_FT
PROCEDURES:  Exploration and fusion, spine, cervical, 2 levels 09-Dec-2020 15:21:27  Regino Sanchez  
PROCEDURES:  Laminectomy, spine, cervical, open 07-Dec-2020 16:44:42  Regino Sanchez  Fusion of cervical spine by combined anterior and posterior approaches 07-Dec-2020 16:44:30  Regino Sanchez  Anterior cervical discectomy and fusion (ACDF) 07-Dec-2020 16:44:13  Regino Sanchez

## 2020-12-09 NOTE — PROGRESS NOTE ADULT - SUBJECTIVE AND OBJECTIVE BOX
Surgery: Posterior cervical revision instrumentation    Consent: Signed by patient    niacin (Hives)    OVERNIGHT EVENTS:    T(C): 36.9 (12-09-20 @ 06:59), Max: 37.1 (12-08-20 @ 20:50)  HR: 77 (12-09-20 @ 06:59) (72 - 86)  BP: 153/79 (12-09-20 @ 06:59) (110/59 - 153/79)  RR: 15 (12-09-20 @ 06:59) (14 - 17)  SpO2: 96% (12-09-20 @ 06:59) (94% - 96%)  Wt(kg): --    EXAM:  Gen: NAD, AAOx3  HEENT: PERRL. EOMI.  Neck: Right anterior and midline posterior incisions C/D/I.  +MIami J  Lungs: Clear b/l  Heart: S1, S2. NSR.  Abd: Soft, NT/ND. +BS  Exts: Pulses 2+ throughout  Neuro: CNs II-XII intact intact. Left UE 4-/5 should abduction pain limited and bruising L upper extremity, 4+/5 elbow flexion, 4/5 extension, 4/5 . Right UE and LEs 5/5 throughout. Sensation to LT decreased throughout left arm. 1+ hyporeflexic to b/l UEs. Speech clear. Following commands.      12-09    138  |  102  |  13  ----------------------------<  94  3.9   |  26  |  0.69    Ca    9.0      09 Dec 2020 07:44  Phos  3.0     12-09  Mg     2.0     12-09    TPro  x   /  Alb  x   /  TBili  0.4  /  DBili  x   /  AST  x   /  ALT  x   /  AlkPhos  x   12-09    CBC Full  -  ( 09 Dec 2020 07:44 )  WBC Count : 13.57 K/uL  RBC Count : 4.05 M/uL  Hemoglobin : 11.8 g/dL  Hematocrit : 36.2 %  Platelet Count - Automated : 193 K/uL  Mean Cell Volume : 89.4 fl  Mean Cell Hemoglobin : 29.1 pg  Mean Cell Hemoglobin Concentration : 32.6 gm/dL  Auto Neutrophil # : x  Auto Lymphocyte # : x  Auto Monocyte # : x  Auto Eosinophil # : x  Auto Basophil # : x  Auto Neutrophil % : x  Auto Lymphocyte % : x  Auto Monocyte % : x  Auto Eosinophil % : x  Auto Basophil % : x    PT/INR - ( 09 Dec 2020 07:44 )   PT: 12.2 sec;   INR: 1.02       Pregnancy test: 12/7/20  Type & Screen (in past 72hrs): 12/7/20    CXR: N/A  EKG: in chart   Medical Clearances:    Last dose of antiplatelet/anticoagulation drug:     Implanted Devices (pacemaker, drug pump...etc):  []YES   [] NO                  If yes --> EPS consulted to interrogate/adjust device    LAST COVID SWAB AND RESUL: not detected  3M nasal swab ordered?  Yes  _N                 Assessment:   51 y/o female POD 2 s/p C6-T1 ACDF, C3-4 Laminectomy, C3-T1 Posterior fusion complaining of L shoulder pain. CT demonstrates screw with increased length at C6-7, going back to OR for posterior cervical revision.       Plan:  - OR for posterior cervical revision instrumentation  - NPO   - LR @ 75   - SQL dced   - Pain control   - Mupirocin/3M 30 minutes prior to OR   - T & S and coags done 12/7/20  - consent in chart    D/W Dr. Garrido

## 2020-12-09 NOTE — DIETITIAN INITIAL EVALUATION ADULT. - OTHER CALCULATIONS
IBW used for calculations as pt >120% of IBW (167%). Needs adjusted for obesity, and post-op wound healing.

## 2020-12-10 PROCEDURE — 99024 POSTOP FOLLOW-UP VISIT: CPT

## 2020-12-10 RX ORDER — SODIUM CHLORIDE 9 MG/ML
1000 INJECTION INTRAMUSCULAR; INTRAVENOUS; SUBCUTANEOUS
Refills: 0 | Status: DISCONTINUED | OUTPATIENT
Start: 2020-12-10 | End: 2020-12-11

## 2020-12-10 RX ADMIN — Medication 1000 MILLIGRAM(S): at 14:00

## 2020-12-10 RX ADMIN — METHOCARBAMOL 500 MILLIGRAM(S): 500 TABLET, FILM COATED ORAL at 06:14

## 2020-12-10 RX ADMIN — Medication 50 MILLIGRAM(S): at 06:12

## 2020-12-10 RX ADMIN — Medication 1000 MILLIGRAM(S): at 07:05

## 2020-12-10 RX ADMIN — SODIUM CHLORIDE 70 MILLILITER(S): 9 INJECTION INTRAMUSCULAR; INTRAVENOUS; SUBCUTANEOUS at 06:13

## 2020-12-10 RX ADMIN — Medication 1000 MILLIGRAM(S): at 13:08

## 2020-12-10 RX ADMIN — Medication 50 MILLIGRAM(S): at 13:08

## 2020-12-10 RX ADMIN — Medication 1000 MILLIGRAM(S): at 06:12

## 2020-12-10 RX ADMIN — ONDANSETRON 4 MILLIGRAM(S): 8 TABLET, FILM COATED ORAL at 06:12

## 2020-12-10 RX ADMIN — METHOCARBAMOL 500 MILLIGRAM(S): 500 TABLET, FILM COATED ORAL at 13:08

## 2020-12-10 RX ADMIN — ONDANSETRON 4 MILLIGRAM(S): 8 TABLET, FILM COATED ORAL at 01:01

## 2020-12-10 RX ADMIN — METHOCARBAMOL 500 MILLIGRAM(S): 500 TABLET, FILM COATED ORAL at 23:17

## 2020-12-10 RX ADMIN — Medication 100 MILLIGRAM(S): at 06:13

## 2020-12-10 NOTE — PROGRESS NOTE ADULT - SUBJECTIVE AND OBJECTIVE BOX
HPI:  Patient presents with chronic neck pain that causes headache and migraine type symptoms, pain in both shoulder blades and then pain radiating through the left shoulder arm and down the hand with sense of weakness in left hand, history of C5-6 ACDF and C4-5 acdf 2017. (07 Dec 2020 16:33)    OVERNIGHT EVENTS:    Hospital Course:  POD# 0 S/P C6-T1 ACDF, C3-4 Laminectomy, C3-T1 Posterior fusion. Complaints of left shoulder/arm pain with paresthesias.  POD#1: +[place] collar. Pain Mgmt consult. PT evaluation. Patient complaining of moderate pain.  POD#2  Patient continues to complain of pain, for OR today  POD#3: left shoulder and arm pain improved, more incisional pain today after OR yesterday.  pt using pca approprioateyl, states that it is helping her pain    Vital Signs Last 24 Hrs  T(C): 36.6 (10 Dec 2020 05:11), Max: 37.2 (09 Dec 2020 20:34)  T(F): 97.9 (10 Dec 2020 05:11), Max: 98.9 (09 Dec 2020 20:34)  HR: 72 (10 Dec 2020 05:11) (71 - 88)  BP: 112/63 (10 Dec 2020 05:11) (107/59 - 144/74)  BP(mean): 95 (09 Dec 2020 19:02) (93 - 109)  RR: 19 (10 Dec 2020 05:11) (11 - 19)  SpO2: 99% (10 Dec 2020 05:11) (94% - 100%)      PHYSICAL EXAM:  Gen: NAD, AAOx3, uncomfortable  HEENT: PERRL. EOMI.  Neck: Right anterior and midline posterior incisions C/D/I. +Hard collar. +posterior HMV.   Lungs: Clear b/l  Heart: S1, S2. NSR.  Abd: Soft, NT/ND. +BS  Exts: Pulses 2+ throughout  Neuro: CNs II-XII intact intact. Left UE 4-/5 should abduction, 4+/5 elbow flexion, 4/5 extension, 4/5 . Right UE and LEs 5/5 throughout. Sensation to LT decreased throughout left arm. 1+ hyporeflexic to b/l UEs. Speech clear. Following commands.      Allergies    niacin (Hives)    Intolerances    MEDICATIONS  (STANDING):  acetaminophen   Tablet .. 1000 milliGRAM(s) Oral every 8 hours  enoxaparin Injectable 40 milliGRAM(s) SubCutaneous at bedtime  HYDROmorphone PCA (1 mG/mL) 30 milliLiter(s) PCA Continuous PCA Continuous  methocarbamol 500 milliGRAM(s) Oral every 8 hours  ondansetron Injectable 4 milliGRAM(s) IV Push every 6 hours  pregabalin 50 milliGRAM(s) Oral three times a day  senna 2 Tablet(s) Oral at bedtime  sodium chloride 0.9%. 1000 milliLiter(s) (70 mL/Hr) IV Continuous <Continuous>    MEDICATIONS  (PRN):  HYDROmorphone  Injectable 0.5 milliGRAM(s) IV Push every 15 minutes PRN Severe Pain (7 - 10)  metoclopramide Injectable 10 milliGRAM(s) IV Push every 8 hours PRN nausea  naloxone Injectable 0.1 milliGRAM(s) IV Push every 3 minutes PRN For ANY of the following changes in patient status:  A. RR LESS THAN 10 breaths per minute, B. Oxygen saturation LESS THAN 90%, C. Sedation score of 6  ondansetron Injectable 4 milliGRAM(s) IV Push every 6 hours PRN Nausea  oxyCODONE    IR 5 milliGRAM(s) Oral every 4 hours PRN Severe Pain (7 - 10)      CULTURES:    RADIOLOGY & ADDITIONAL TESTS:      ASSESSMENT:  50y Female with PMH of HLD, HTN, asthma, cervical radiculopathy s/p C6-T1 ACDF, C3-4 Laminectomy, C3-T1 Posterior fusion, patient for OR today  - Continue Acetaminophen 1000 mg Po q8h  - Continue methocarbamol 500 mg Po q8h  - Continue Pregabalin to 50 mg Po q8h  - Continue PCA for today, will DC tomorrow  -If ok with surgeon, start Ketorolac 30 mg IV q8h  - When DC PCA tomorrow start Oxycodone 5-10 mg Po q4h prn moderate to severe tomorrow   - Continue bowel regimen    Plan discussed with patient and team.  All questions answered,  Will continue to follow.

## 2020-12-10 NOTE — PROGRESS NOTE ADULT - ASSESSMENT
Pt doing well POD#1 s/p revision spine surgery.   1. Maintain Prevena for 7 days  2. If HV drain is still less than 50mL/24 hours tomorrow, ok to remove.   3. Keep JAYNE drain. Will removed when less than 20mL/24 hours  4. Will follow

## 2020-12-10 NOTE — PROGRESS NOTE ADULT - ATTENDING COMMENTS
left arm pain essentially resolved. Posterior incisional pain moderate. Some residual left shoulder discomfort and stiffness  Strong symmetric delt bi tri HI, IP Q  mild tenderness to palpation left shoulder and with passive ROM    mobilize  normal diet  collar when OOB  plastics/drain management  discussed anticipated recovery process, all questions answered

## 2020-12-10 NOTE — PROGRESS NOTE ADULT - SUBJECTIVE AND OBJECTIVE BOX
Pt seen and examined.   No acute events.     Exam:  Prevena vac in place.   Drains 10mL & 50mL in 24 hours

## 2020-12-11 LAB
ANION GAP SERPL CALC-SCNC: 8 MMOL/L — SIGNIFICANT CHANGE UP (ref 5–17)
BUN SERPL-MCNC: 13 MG/DL — SIGNIFICANT CHANGE UP (ref 7–23)
CALCIUM SERPL-MCNC: 8.5 MG/DL — SIGNIFICANT CHANGE UP (ref 8.4–10.5)
CHLORIDE SERPL-SCNC: 102 MMOL/L — SIGNIFICANT CHANGE UP (ref 96–108)
CO2 SERPL-SCNC: 28 MMOL/L — SIGNIFICANT CHANGE UP (ref 22–31)
CREAT SERPL-MCNC: 0.65 MG/DL — SIGNIFICANT CHANGE UP (ref 0.5–1.3)
GLUCOSE SERPL-MCNC: 93 MG/DL — SIGNIFICANT CHANGE UP (ref 70–99)
HCT VFR BLD CALC: 34.1 % — LOW (ref 34.5–45)
HGB BLD-MCNC: 11 G/DL — LOW (ref 11.5–15.5)
MAGNESIUM SERPL-MCNC: 1.7 MG/DL — SIGNIFICANT CHANGE UP (ref 1.6–2.6)
MCHC RBC-ENTMCNC: 29 PG — SIGNIFICANT CHANGE UP (ref 27–34)
MCHC RBC-ENTMCNC: 32.3 GM/DL — SIGNIFICANT CHANGE UP (ref 32–36)
MCV RBC AUTO: 90 FL — SIGNIFICANT CHANGE UP (ref 80–100)
NRBC # BLD: 0 /100 WBCS — SIGNIFICANT CHANGE UP (ref 0–0)
PHOSPHATE SERPL-MCNC: 2.7 MG/DL — SIGNIFICANT CHANGE UP (ref 2.5–4.5)
PLATELET # BLD AUTO: 207 K/UL — SIGNIFICANT CHANGE UP (ref 150–400)
POTASSIUM SERPL-MCNC: 3.5 MMOL/L — SIGNIFICANT CHANGE UP (ref 3.5–5.3)
POTASSIUM SERPL-SCNC: 3.5 MMOL/L — SIGNIFICANT CHANGE UP (ref 3.5–5.3)
RBC # BLD: 3.79 M/UL — LOW (ref 3.8–5.2)
RBC # FLD: 13.7 % — SIGNIFICANT CHANGE UP (ref 10.3–14.5)
SODIUM SERPL-SCNC: 138 MMOL/L — SIGNIFICANT CHANGE UP (ref 135–145)
WBC # BLD: 12.77 K/UL — HIGH (ref 3.8–10.5)
WBC # FLD AUTO: 12.77 K/UL — HIGH (ref 3.8–10.5)

## 2020-12-11 PROCEDURE — 99024 POSTOP FOLLOW-UP VISIT: CPT

## 2020-12-11 RX ORDER — POTASSIUM CHLORIDE 20 MEQ
40 PACKET (EA) ORAL ONCE
Refills: 0 | Status: COMPLETED | OUTPATIENT
Start: 2020-12-11 | End: 2020-12-11

## 2020-12-11 RX ORDER — METHOCARBAMOL 500 MG/1
750 TABLET, FILM COATED ORAL EVERY 8 HOURS
Refills: 0 | Status: DISCONTINUED | OUTPATIENT
Start: 2020-12-11 | End: 2020-12-12

## 2020-12-11 RX ORDER — MAGNESIUM SULFATE 500 MG/ML
2 VIAL (ML) INJECTION ONCE
Refills: 0 | Status: COMPLETED | OUTPATIENT
Start: 2020-12-11 | End: 2020-12-11

## 2020-12-11 RX ORDER — KETOROLAC TROMETHAMINE 30 MG/ML
30 SYRINGE (ML) INJECTION EVERY 8 HOURS
Refills: 0 | Status: DISCONTINUED | OUTPATIENT
Start: 2020-12-11 | End: 2020-12-11

## 2020-12-11 RX ORDER — OXYCODONE HYDROCHLORIDE 5 MG/1
5 TABLET ORAL EVERY 4 HOURS
Refills: 0 | Status: DISCONTINUED | OUTPATIENT
Start: 2020-12-11 | End: 2020-12-12

## 2020-12-11 RX ORDER — OXYCODONE HYDROCHLORIDE 5 MG/1
10 TABLET ORAL EVERY 4 HOURS
Refills: 0 | Status: DISCONTINUED | OUTPATIENT
Start: 2020-12-11 | End: 2020-12-12

## 2020-12-11 RX ADMIN — Medication 75 MILLIGRAM(S): at 15:38

## 2020-12-11 RX ADMIN — METHOCARBAMOL 750 MILLIGRAM(S): 500 TABLET, FILM COATED ORAL at 15:38

## 2020-12-11 RX ADMIN — Medication 1000 MILLIGRAM(S): at 21:05

## 2020-12-11 RX ADMIN — Medication 1000 MILLIGRAM(S): at 05:34

## 2020-12-11 RX ADMIN — OXYCODONE HYDROCHLORIDE 10 MILLIGRAM(S): 5 TABLET ORAL at 13:20

## 2020-12-11 RX ADMIN — ENOXAPARIN SODIUM 40 MILLIGRAM(S): 100 INJECTION SUBCUTANEOUS at 21:03

## 2020-12-11 RX ADMIN — Medication 1000 MILLIGRAM(S): at 06:56

## 2020-12-11 RX ADMIN — METHOCARBAMOL 500 MILLIGRAM(S): 500 TABLET, FILM COATED ORAL at 05:34

## 2020-12-11 RX ADMIN — ONDANSETRON 4 MILLIGRAM(S): 8 TABLET, FILM COATED ORAL at 14:18

## 2020-12-11 RX ADMIN — Medication 1000 MILLIGRAM(S): at 16:30

## 2020-12-11 RX ADMIN — Medication 40 MILLIEQUIVALENT(S): at 15:38

## 2020-12-11 RX ADMIN — Medication 50 GRAM(S): at 15:39

## 2020-12-11 RX ADMIN — OXYCODONE HYDROCHLORIDE 10 MILLIGRAM(S): 5 TABLET ORAL at 09:00

## 2020-12-11 RX ADMIN — Medication 1000 MILLIGRAM(S): at 15:38

## 2020-12-11 RX ADMIN — METHOCARBAMOL 750 MILLIGRAM(S): 500 TABLET, FILM COATED ORAL at 21:02

## 2020-12-11 RX ADMIN — Medication 30 MILLIGRAM(S): at 21:20

## 2020-12-11 RX ADMIN — OXYCODONE HYDROCHLORIDE 10 MILLIGRAM(S): 5 TABLET ORAL at 08:33

## 2020-12-11 RX ADMIN — Medication 75 MILLIGRAM(S): at 21:02

## 2020-12-11 RX ADMIN — OXYCODONE HYDROCHLORIDE 10 MILLIGRAM(S): 5 TABLET ORAL at 22:20

## 2020-12-11 RX ADMIN — SENNA PLUS 2 TABLET(S): 8.6 TABLET ORAL at 21:02

## 2020-12-11 RX ADMIN — OXYCODONE HYDROCHLORIDE 10 MILLIGRAM(S): 5 TABLET ORAL at 12:45

## 2020-12-11 RX ADMIN — Medication 1000 MILLIGRAM(S): at 21:20

## 2020-12-11 RX ADMIN — Medication 50 MILLIGRAM(S): at 05:34

## 2020-12-11 RX ADMIN — Medication 30 MILLIGRAM(S): at 14:29

## 2020-12-11 RX ADMIN — Medication 30 MILLIGRAM(S): at 22:35

## 2020-12-11 RX ADMIN — OXYCODONE HYDROCHLORIDE 10 MILLIGRAM(S): 5 TABLET ORAL at 21:05

## 2020-12-11 RX ADMIN — Medication 30 MILLIGRAM(S): at 15:15

## 2020-12-11 NOTE — PROGRESS NOTE ADULT - SUBJECTIVE AND OBJECTIVE BOX
HPI:  Patient presents with chronic neck pain that causes headache and migraine type symptoms, pain in both shoulder blades and then pain radiating through the left shoulder arm and down the hand with sense of weakness in left hand, history of C5-6 ACDF and C4-5 acdf 2017. (07 Dec 2020 16:33)    OVERNIGHT EVENTS:    Hospital Course:  POD# 0 S/P C6-T1 ACDF, C3-4 Laminectomy, C3-T1 Posterior fusion. Complaints of left shoulder/arm pain with paresthesias.  POD#1: +[place] collar. Pain Mgmt consult. PT evaluation. Patient complaining of moderate pain.  POD#2  Patient continues to complain of pain, for OR today  POD#3: left shoulder and arm pain improved, more incisional pain today after OR yesterday.  pt using pca appropriately states that it is helping her pain  POD#4 ; pain better controlled, describes muscle burning in right posterior cervical region and scalp itchiness    Vital Signs Last 24 Hrs  T(C): 36.6 (11 Dec 2020 05:33), Max: 36.9 (10 Dec 2020 09:13)  T(F): 97.9 (11 Dec 2020 05:33), Max: 98.5 (10 Dec 2020 13:05)  HR: 73 (11 Dec 2020 05:33) (73 - 88)  BP: 118/60 (11 Dec 2020 05:33) (114/55 - 146/76)  BP(mean): --  RR: 17 (11 Dec 2020 05:33) (15 - 20)  SpO2: 96% (11 Dec 2020 05:33) (93% - 96%)      PHYSICAL EXAM:  Gen: NAD, AAOx3, uncomfortable  HEENT: PERRL. EOMI.  Neck: Right anterior and midline posterior incisions C/D/I. +Hard collar. +posterior HMV.   Lungs: Clear b/l  Heart: S1, S2. NSR.  Abd: Soft, NT/ND. +BS  Exts: Pulses 2+ throughout  Neuro: CNs II-XII intact intact. Left UE 4-/5 should abduction, 4+/5 elbow flexion, 4/5 extension, 4/5 . Right UE and LEs 5/5 throughout. Sensation to LT decreased throughout left arm. 1+ hyporeflexic to b/l UEs. Speech clear. Following commands.      Allergies    niacin (Hives)    Intolerances    MEDICATIONS  (STANDING):  acetaminophen   Tablet .. 1000 milliGRAM(s) Oral every 8 hours  enoxaparin Injectable 40 milliGRAM(s) SubCutaneous at bedtime  methocarbamol 500 milliGRAM(s) Oral every 8 hours  ondansetron Injectable 4 milliGRAM(s) IV Push every 6 hours  pregabalin 50 milliGRAM(s) Oral three times a day  senna 2 Tablet(s) Oral at bedtime  sodium chloride 0.9%. 1000 milliLiter(s) (70 mL/Hr) IV Continuous <Continuous>    MEDICATIONS  (PRN):  metoclopramide Injectable 10 milliGRAM(s) IV Push every 8 hours PRN nausea  naloxone Injectable 0.1 milliGRAM(s) IV Push every 3 minutes PRN For ANY of the following changes in patient status:  A. RR LESS THAN 10 breaths per minute, B. Oxygen saturation LESS THAN 90%, C. Sedation score of 6  ondansetron Injectable 4 milliGRAM(s) IV Push every 6 hours PRN Nausea  oxyCODONE    IR 5 milliGRAM(s) Oral every 4 hours PRN Moderate Pain (4 - 6)  oxyCODONE    IR 10 milliGRAM(s) Oral every 4 hours PRN Severe Pain (7 - 10)        CULTURES:    RADIOLOGY & ADDITIONAL TESTS:      ASSESSMENT:  50y Female with PMH of HLD, HTN, asthma, cervical radiculopathy s/p C6-T1 ACDF, C3-4 Laminectomy, C3-T1 Posterior fusion, patient for OR today  - Continue Acetaminophen 1000 mg Po q8h  - Increase methocarbamol to 750 mg Po q8h  - Increase Pregabalin to 75 mg Po q8h  - If ok with surgeon, start Ketorolac 30 mg IV q8h  - Start Oxycodone 5-10 mg Po q4h prn moderate to severe tomorrow   - Continue bowel regimen    Plan discussed with patient and team.  All questions answered,  Will continue to follow.

## 2020-12-11 NOTE — PROGRESS NOTE ADULT - ATTENDING COMMENTS
sitting up, in good spirits  left arm pain substantially improved  tolerating full diet  posterior incisional pain moderate  left shoulder discomfort to palpation and passive ROM but strong resistance    likely underlying left shoulder/RC arthritis  work with PT OT  plastics/drains  potential home tomorrow if mobilizing well and drains ready to come out

## 2020-12-11 NOTE — PROGRESS NOTE ADULT - SUBJECTIVE AND OBJECTIVE BOX
HPI:  Patient presents with chronic neck pain that causes headache and migraine type symptoms, pain in both shoulder blades and then pain radiating through the left shoulder arm and down the hand with sense of weakness in left hand, history of C5-6 ACDF and C4-5 acdf 2017.    Update: 12/9/2020.     patient complains of persistent increased left arm pain s/p C6-T1 ACDF, C3-4 Laminectomy, C3-T1 Posterior Fusion 12/7/20. It has been decided that she will be brought back to OR for exploration of fusion and reinstrumentation. (07 Dec 2020 16:33)  HPI:  Patient presents with chronic neck pain that causes headache and migraine type symptoms, pain in both shoulder blades and then pain radiating through the left shoulder arm and down the hand with sense of weakness in left hand, history of C5-6 ACDF and C4-5 acdf 2017. (07 Dec 2020 16:33)    OVERNIGHT EVENTS:    Hospital Course:  12/7: POD# 0 S/P C6-T1 ACDF, C3-4 Laminectomy, C3-T1 Posterior fusion. Complaints of left shoulder/arm pain with paresthesias.  12/8: POD#1: +[place] J collar. Pain Mgmt consult. PT evaluation. [place] J in place. Dr. Hannon following for pain. CT C-spine for LUE pain/weakness - long screw at C6-7   12/9: POD#2/ POD#0 s/p Exploration and revisional posterior instrumentation of cervical spine with plastic surgery closure (Dr. Fonseca),   12/10: POD#3/ POD#1 PAULA overnight, neuro exam stable, LUE radicular pain present but improved from preop, on PCA  12/11: POD#4/ POD#2 PAULA overnight, neuro exam stable      Vital Signs Last 24 Hrs  T(C): 36.6 (11 Dec 2020 01:14), Max: 36.9 (10 Dec 2020 09:13)  T(F): 97.9 (11 Dec 2020 01:14), Max: 98.5 (10 Dec 2020 13:05)  HR: 75 (11 Dec 2020 01:14) (72 - 88)  BP: 114/55 (11 Dec 2020 01:14) (112/63 - 146/76)  BP(mean): --  RR: 17 (11 Dec 2020 01:14) (15 - 20)  SpO2: 95% (11 Dec 2020 01:14) (93% - 99%)    I&O's Summary    09 Dec 2020 07:01  -  10 Dec 2020 07:00  --------------------------------------------------------  IN: 1290 mL / OUT: 2354.5 mL / NET: -1064.5 mL    10 Dec 2020 07:01  -  11 Dec 2020 03:19  --------------------------------------------------------  IN: 1130 mL / OUT: 2619.5 mL / NET: -1489.5 mL        PHYSICAL EXAM:  General: patient seen sitting upright in bed in [facility]  Neuro: AAOx3, FC, OE spontaneously, speech clear and fluent, face symmetric, no pronator drift, finger to nose intact, strength 5/5 b/l LE and RUE, LUE shoulder extension/deltoid 4/5 o/w 5/5, SILT throughout  HEENT: PERRL, EOMI  Neck: supple, [place] J collar in place  Cardiac: RRR, S1S2  Pulmonary: chest rise symmetric  Abdomen: soft, nontender, nondistended  Ext: warm, perfusing well        TUBES/LINES:  [] Jose  [] Lumbar Drain  [x] Wound Drains - superficial JAYNE x 1, deep HMV x 1  [] Others      DIET:  [] NPO  [x] Mechanical  [] Tube feeds    LABS:                        11.8   13.57 )-----------( 193      ( 09 Dec 2020 07:44 )             36.2     12-09    138  |  102  |  13  ----------------------------<  94  3.9   |  26  |  0.69    Ca    9.0      09 Dec 2020 07:44  Phos  3.0     12-09  Mg     2.0     12-09    TPro  x   /  Alb  x   /  TBili  0.4  /  DBili  x   /  AST  x   /  ALT  x   /  AlkPhos  x   12-09    PT/INR - ( 09 Dec 2020 07:44 )   PT: 12.2 sec;   INR: 1.02                  CAPILLARY BLOOD GLUCOSE          Drug Levels: [] N/A    CSF Analysis: [] N/A      Allergies    niacin (Hives)    Intolerances      MEDICATIONS:  Antibiotics:    Neuro:  acetaminophen   Tablet .. 1000 milliGRAM(s) Oral every 8 hours  HYDROmorphone PCA (1 mG/mL) 30 milliLiter(s) PCA Continuous PCA Continuous  methocarbamol 500 milliGRAM(s) Oral every 8 hours  metoclopramide Injectable 10 milliGRAM(s) IV Push every 8 hours PRN  ondansetron Injectable 4 milliGRAM(s) IV Push every 6 hours PRN  ondansetron Injectable 4 milliGRAM(s) IV Push every 6 hours  pregabalin 50 milliGRAM(s) Oral three times a day    Anticoagulation:  enoxaparin Injectable 40 milliGRAM(s) SubCutaneous at bedtime    OTHER:  naloxone Injectable 0.1 milliGRAM(s) IV Push every 3 minutes PRN  senna 2 Tablet(s) Oral at bedtime    IVF:  sodium chloride 0.9%. 1000 milliLiter(s) IV Continuous <Continuous>    CULTURES:    RADIOLOGY & ADDITIONAL TESTS:      ASSESSMENT:  51 y/o female POD 4 s/p C6-T1 ACDF, C3-4 Laminectomy, C3-T1 Posterior fusion (12/7), post-op CT  demonstrating screw with increased length at C6-7.  Now POD 2 from exploration and revisional posterior instrumentation of cervical spine (12/9).    : CERVICALGIA    Handoff    MEWS Score    Asthma    Cervical radiculopathy    Back pain    HLD (hyperlipidemia)    HTN (hypertension)    Cervical radiculopathy    Exploration and fusion, spine, cervical, 2 levels    Laminectomy, spine, cervical, open    Fusion of cervical spine by combined anterior and posterior approaches    Anterior cervical discectomy and fusion (ACDF)    Surgery, elective    SysAdmin_VstLnk        PLAN:  - pain control, Dr. Hannon following, on PCA dilaudid, plan to d/c PCA today and add Oxycodone 5-10 mg Po q4h prn for moderate to severe pain  - neuro and vital checks  - JAYNE and HMV  managed by Plastic Surgery, Dr. Fonseca: can d/c HMV today if <50cc/24 hrs, d/c JAYNE when <20cc/24 hrs  - regular diet  - bowel regimen  - SCDs for DVT ppx  - telemetry status, full code, dispo pending      D/w Dr. Garrido  Assessment:  Present when checked    []  GCS  E   V  M     Heart Failure: []Acute, [] acute on chronic , []chronic  Heart Failure:  [] Diastolic (HFpEF), [] Systolic (HFrEF), []Combined (HFpEF and HFrEF), [] RHF, [] Pulm HTN, [] Other    [] CASTRO, [] ATN, [] AIN, [] other  [] CKD1, [] CKD2, [] CKD 3, [] CKD 4, [] CKD 5, []ESRD    Encephalopathy: [] Metabolic, [] Hepatic, [] toxic, [] Neurological, [] Other    Abnormal Nurtitional Status: [] malnurtition (see nutrition note), [ ]underweight: BMI < 19, [] morbid obesity: BMI >40, [] Cachexia    [] Sepsis  [] hypovolemic shock,[] cardiogenic shock, [] hemorrhagic shock, [] neuogenic shock  [] Acute Respiratory Failure  []Cerebral edema, [] Brain compression/ herniation,   [] Functional quadriplegia  [] Acute blood loss anemia   HPI:  Patient presents with chronic neck pain that causes headache and migraine type symptoms, pain in both shoulder blades and then pain radiating through the left shoulder arm and down the hand with sense of weakness in left hand, history of C5-6 ACDF and C4-5 acdf 2017.    Update: 12/9/2020.     patient complains of persistent increased left arm pain s/p C6-T1 ACDF, C3-4 Laminectomy, C3-T1 Posterior Fusion 12/7/20. It has been decided that she will be brought back to OR for exploration of fusion and reinstrumentation. (07 Dec 2020 16:33)  HPI:  Patient presents with chronic neck pain that causes headache and migraine type symptoms, pain in both shoulder blades and then pain radiating through the left shoulder arm and down the hand with sense of weakness in left hand, history of C5-6 ACDF and C4-5 acdf 2017. (07 Dec 2020 16:33)    OVERNIGHT EVENTS:    Hospital Course:  12/7: POD# 0 S/P C6-T1 ACDF, C3-4 Laminectomy, C3-T1 Posterior fusion. Complaints of left shoulder/arm pain with paresthesias.  12/8: POD#1: +[place] J collar. Pain Mgmt consult. PT evaluation. [place] J in place. Dr. Hannon following for pain. CT C-spine for LUE pain/weakness - long screw at C6-7   12/9: POD#2/ POD#0 s/p Exploration and revisional posterior instrumentation of cervical spine with plastic surgery closure (Dr. Fonseca),   12/10: POD#3/ POD#1 PAULA overnight, neuro exam stable, LUE radicular pain present but improved from preop, on PCA  12/11: POD#4/ POD#2 PAULA overnight, neuro exam stable      Vital Signs Last 24 Hrs  T(C): 36.6 (11 Dec 2020 01:14), Max: 36.9 (10 Dec 2020 09:13)  T(F): 97.9 (11 Dec 2020 01:14), Max: 98.5 (10 Dec 2020 13:05)  HR: 75 (11 Dec 2020 01:14) (72 - 88)  BP: 114/55 (11 Dec 2020 01:14) (112/63 - 146/76)  BP(mean): --  RR: 17 (11 Dec 2020 01:14) (15 - 20)  SpO2: 95% (11 Dec 2020 01:14) (93% - 99%)    I&O's Summary    09 Dec 2020 07:01  -  10 Dec 2020 07:00  --------------------------------------------------------  IN: 1290 mL / OUT: 2354.5 mL / NET: -1064.5 mL    10 Dec 2020 07:01  -  11 Dec 2020 03:19  --------------------------------------------------------  IN: 1130 mL / OUT: 2619.5 mL / NET: -1489.5 mL        PHYSICAL EXAM:  General: patient seen sitting upright in bed in [facility]  Neuro: AAOx3, FC, OE spontaneously, speech clear and fluent, face symmetric, no pronator drift, finger to nose intact, strength 5/5 b/l LE and RUE, LUE shoulder extension/deltoid 4/5 o/w 5/5, SILT throughout  HEENT: PERRL, EOMI  Neck: supple, [place] J collar in place  Cardiac: RRR, S1S2  Pulmonary: chest rise symmetric  Abdomen: soft, nontender, nondistended  Ext: warm, perfusing well        TUBES/LINES:  [] Jose  [] Lumbar Drain  [x] Wound Drains - superficial JAYNE x 1, deep HMV x 1  [] Others      DIET:  [] NPO  [x] Mechanical  [] Tube feeds    LABS:                        11.8   13.57 )-----------( 193      ( 09 Dec 2020 07:44 )             36.2     12-09    138  |  102  |  13  ----------------------------<  94  3.9   |  26  |  0.69    Ca    9.0      09 Dec 2020 07:44  Phos  3.0     12-09  Mg     2.0     12-09    TPro  x   /  Alb  x   /  TBili  0.4  /  DBili  x   /  AST  x   /  ALT  x   /  AlkPhos  x   12-09    PT/INR - ( 09 Dec 2020 07:44 )   PT: 12.2 sec;   INR: 1.02                  CAPILLARY BLOOD GLUCOSE          Drug Levels: [] N/A    CSF Analysis: [] N/A      Allergies    niacin (Hives)    Intolerances      MEDICATIONS:  Antibiotics:    Neuro:  acetaminophen   Tablet .. 1000 milliGRAM(s) Oral every 8 hours  HYDROmorphone PCA (1 mG/mL) 30 milliLiter(s) PCA Continuous PCA Continuous  methocarbamol 500 milliGRAM(s) Oral every 8 hours  metoclopramide Injectable 10 milliGRAM(s) IV Push every 8 hours PRN  ondansetron Injectable 4 milliGRAM(s) IV Push every 6 hours PRN  ondansetron Injectable 4 milliGRAM(s) IV Push every 6 hours  pregabalin 50 milliGRAM(s) Oral three times a day    Anticoagulation:  enoxaparin Injectable 40 milliGRAM(s) SubCutaneous at bedtime    OTHER:  naloxone Injectable 0.1 milliGRAM(s) IV Push every 3 minutes PRN  senna 2 Tablet(s) Oral at bedtime    IVF:  sodium chloride 0.9%. 1000 milliLiter(s) IV Continuous <Continuous>    CULTURES:    RADIOLOGY & ADDITIONAL TESTS:      ASSESSMENT:  51 y/o female POD 4 s/p C6-T1 ACDF, C3-4 Laminectomy, C3-T1 Posterior fusion (12/7), post-op CT  demonstrating screw with increased length at C6-7.  Now POD 2 from exploration and revisional posterior instrumentation of cervical spine (12/9).    : CERVICALGIA    Handoff    MEWS Score    Asthma    Cervical radiculopathy    Back pain    HLD (hyperlipidemia)    HTN (hypertension)    Cervical radiculopathy    Exploration and fusion, spine, cervical, 2 levels    Laminectomy, spine, cervical, open    Fusion of cervical spine by combined anterior and posterior approaches    Anterior cervical discectomy and fusion (ACDF)    Surgery, elective    SysAdmin_VstLnk        PLAN:  - pain control, Dr. Hannon following, on PCA dilaudid, plan to d/c PCA today and add Oxycodone 5-10 mg Po q4h prn for moderate to severe pain  - neuro and vital checks  - JAYNE and HMV  managed by Plastic Surgery, Dr. Fonseca: can d/c HMV today if <50cc/24 hrs, d/c JAYNE when <20cc/24 hrs, keep prevena dressing x 7 days  - regular diet  - bowel regimen  - SCDs for DVT ppx  - telemetry status, full code, dispo pending  - PT/OT      D/w Dr. Garrido  Assessment:  Present when checked    []  GCS  E   V  M     Heart Failure: []Acute, [] acute on chronic , []chronic  Heart Failure:  [] Diastolic (HFpEF), [] Systolic (HFrEF), []Combined (HFpEF and HFrEF), [] RHF, [] Pulm HTN, [] Other    [] CASTRO, [] ATN, [] AIN, [] other  [] CKD1, [] CKD2, [] CKD 3, [] CKD 4, [] CKD 5, []ESRD    Encephalopathy: [] Metabolic, [] Hepatic, [] toxic, [] Neurological, [] Other    Abnormal Nurtitional Status: [] malnurtition (see nutrition note), [ ]underweight: BMI < 19, [] morbid obesity: BMI >40, [] Cachexia    [] Sepsis  [] hypovolemic shock,[] cardiogenic shock, [] hemorrhagic shock, [] neuogenic shock  [] Acute Respiratory Failure  []Cerebral edema, [] Brain compression/ herniation,   [] Functional quadriplegia  [] Acute blood loss anemia   HPI:  Patient presents with chronic neck pain that causes headache and migraine type symptoms, pain in both shoulder blades and then pain radiating through the left shoulder arm and down the hand with sense of weakness in left hand, history of C5-6 ACDF and C4-5 acdf 2017.    Update: 12/9/2020.     patient complains of persistent increased left arm pain s/p C6-T1 ACDF, C3-4 Laminectomy, C3-T1 Posterior Fusion 12/7/20. It has been decided that she will be brought back to OR for exploration of fusion and reinstrumentation. (07 Dec 2020 16:33)  HPI:  Patient presents with chronic neck pain that causes headache and migraine type symptoms, pain in both shoulder blades and then pain radiating through the left shoulder arm and down the hand with sense of weakness in left hand, history of C5-6 ACDF and C4-5 acdf 2017. (07 Dec 2020 16:33)    OVERNIGHT EVENTS:    Hospital Course:  12/7: POD# 0 S/P C6-T1 ACDF, C3-4 Laminectomy, C3-T1 Posterior fusion. Complaints of left shoulder/arm pain with paresthesias.  12/8: POD#1: +[place] J collar. Pain Mgmt consult. PT evaluation. [place] J in place. Dr. Hannon following for pain. CT C-spine for LUE pain/weakness - long screw at C6-7   12/9: POD#2/ POD#0 s/p Exploration and revisional posterior instrumentation of cervical spine with plastic surgery closure (Dr. Fonseca),   12/10: POD#3/ POD#1 PAULA overnight, neuro exam stable, LUE radicular pain present but improved from preop, on PCA  12/11: POD#4/ POD#2 PAULA overnight, neuro exam stable      Vital Signs Last 24 Hrs  T(C): 36.6 (11 Dec 2020 01:14), Max: 36.9 (10 Dec 2020 09:13)  T(F): 97.9 (11 Dec 2020 01:14), Max: 98.5 (10 Dec 2020 13:05)  HR: 75 (11 Dec 2020 01:14) (72 - 88)  BP: 114/55 (11 Dec 2020 01:14) (112/63 - 146/76)  BP(mean): --  RR: 17 (11 Dec 2020 01:14) (15 - 20)  SpO2: 95% (11 Dec 2020 01:14) (93% - 99%)    I&O's Summary    09 Dec 2020 07:01  -  10 Dec 2020 07:00  --------------------------------------------------------  IN: 1290 mL / OUT: 2354.5 mL / NET: -1064.5 mL    10 Dec 2020 07:01  -  11 Dec 2020 03:19  --------------------------------------------------------  IN: 1130 mL / OUT: 2619.5 mL / NET: -1489.5 mL        PHYSICAL EXAM:  General: patient seen sitting upright in bed in [facility]  Neuro: AAOx3, FC, OE spontaneously, speech clear and fluent, face symmetric, no pronator drift, finger to nose intact, strength 5/5 b/l LE and RUE, LUE shoulder extension/deltoid 4/5 o/w 5/5, SILT throughout  HEENT: PERRL, EOMI  Neck: supple, [place] J collar in place  Cardiac: RRR, S1S2  Pulmonary: chest rise symmetric  Abdomen: soft, nontender, nondistended  Ext: warm, perfusing well  Wound: anterior cervical incision c/d/i, posterior cervical incision covered by prevena dressing        TUBES/LINES:  [] Jose  [] Lumbar Drain  [x] Wound Drains - superficial JAYNE x 1, deep HMV x 1  [] Others      DIET:  [] NPO  [x] Mechanical  [] Tube feeds    LABS:                        11.8   13.57 )-----------( 193      ( 09 Dec 2020 07:44 )             36.2     12-09    138  |  102  |  13  ----------------------------<  94  3.9   |  26  |  0.69    Ca    9.0      09 Dec 2020 07:44  Phos  3.0     12-09  Mg     2.0     12-09    TPro  x   /  Alb  x   /  TBili  0.4  /  DBili  x   /  AST  x   /  ALT  x   /  AlkPhos  x   12-09    PT/INR - ( 09 Dec 2020 07:44 )   PT: 12.2 sec;   INR: 1.02                  CAPILLARY BLOOD GLUCOSE          Drug Levels: [] N/A    CSF Analysis: [] N/A      Allergies    niacin (Hives)    Intolerances      MEDICATIONS:  Antibiotics:    Neuro:  acetaminophen   Tablet .. 1000 milliGRAM(s) Oral every 8 hours  HYDROmorphone PCA (1 mG/mL) 30 milliLiter(s) PCA Continuous PCA Continuous  methocarbamol 500 milliGRAM(s) Oral every 8 hours  metoclopramide Injectable 10 milliGRAM(s) IV Push every 8 hours PRN  ondansetron Injectable 4 milliGRAM(s) IV Push every 6 hours PRN  ondansetron Injectable 4 milliGRAM(s) IV Push every 6 hours  pregabalin 50 milliGRAM(s) Oral three times a day    Anticoagulation:  enoxaparin Injectable 40 milliGRAM(s) SubCutaneous at bedtime    OTHER:  naloxone Injectable 0.1 milliGRAM(s) IV Push every 3 minutes PRN  senna 2 Tablet(s) Oral at bedtime    IVF:  sodium chloride 0.9%. 1000 milliLiter(s) IV Continuous <Continuous>    CULTURES:    RADIOLOGY & ADDITIONAL TESTS:      ASSESSMENT:  51 y/o female POD 4 s/p C6-T1 ACDF, C3-4 Laminectomy, C3-T1 Posterior fusion (12/7), post-op CT  demonstrating screw with increased length at C6-7.  Now POD 2 from exploration and revisional posterior instrumentation of cervical spine (12/9).    : CERVICALGIA    Handoff    MEWS Score    Asthma    Cervical radiculopathy    Back pain    HLD (hyperlipidemia)    HTN (hypertension)    Cervical radiculopathy    Exploration and fusion, spine, cervical, 2 levels    Laminectomy, spine, cervical, open    Fusion of cervical spine by combined anterior and posterior approaches    Anterior cervical discectomy and fusion (ACDF)    Surgery, elective    SysAdmin_VstLnk        PLAN:  - pain control, Dr. Hannon following, on PCA dilaudid, plan to d/c PCA today and add Oxycodone 5-10 mg Po q4h prn for moderate to severe pain  - neuro and vital checks  - JAYNE and HMV  managed by Plastic Surgery, Dr. Fonseca: can d/c HMV today if <50cc/24 hrs, d/c JAYNE when <20cc/24 hrs, keep prevena dressing x 7 days  - regular diet  - bowel regimen  - SCDs for DVT ppx  - telemetry status, full code, dispo pending  - PT/OT      D/w Dr. Garrido  Assessment:  Present when checked    []  GCS  E   V  M     Heart Failure: []Acute, [] acute on chronic , []chronic  Heart Failure:  [] Diastolic (HFpEF), [] Systolic (HFrEF), []Combined (HFpEF and HFrEF), [] RHF, [] Pulm HTN, [] Other    [] CASTRO, [] ATN, [] AIN, [] other  [] CKD1, [] CKD2, [] CKD 3, [] CKD 4, [] CKD 5, []ESRD    Encephalopathy: [] Metabolic, [] Hepatic, [] toxic, [] Neurological, [] Other    Abnormal Nurtitional Status: [] malnurtition (see nutrition note), [ ]underweight: BMI < 19, [] morbid obesity: BMI >40, [] Cachexia    [] Sepsis  [] hypovolemic shock,[] cardiogenic shock, [] hemorrhagic shock, [] neuogenic shock  [] Acute Respiratory Failure  []Cerebral edema, [] Brain compression/ herniation,   [] Functional quadriplegia  [] Acute blood loss anemia

## 2020-12-12 ENCOUNTER — TRANSCRIPTION ENCOUNTER (OUTPATIENT)
Age: 50
End: 2020-12-12

## 2020-12-12 VITALS
SYSTOLIC BLOOD PRESSURE: 128 MMHG | HEART RATE: 98 BPM | DIASTOLIC BLOOD PRESSURE: 81 MMHG | RESPIRATION RATE: 18 BRPM | TEMPERATURE: 98 F | OXYGEN SATURATION: 96 %

## 2020-12-12 LAB
ANION GAP SERPL CALC-SCNC: 10 MMOL/L — SIGNIFICANT CHANGE UP (ref 5–17)
BUN SERPL-MCNC: 9 MG/DL — SIGNIFICANT CHANGE UP (ref 7–23)
CALCIUM SERPL-MCNC: 8.8 MG/DL — SIGNIFICANT CHANGE UP (ref 8.4–10.5)
CHLORIDE SERPL-SCNC: 102 MMOL/L — SIGNIFICANT CHANGE UP (ref 96–108)
CO2 SERPL-SCNC: 28 MMOL/L — SIGNIFICANT CHANGE UP (ref 22–31)
CREAT SERPL-MCNC: 0.63 MG/DL — SIGNIFICANT CHANGE UP (ref 0.5–1.3)
GLUCOSE SERPL-MCNC: 107 MG/DL — HIGH (ref 70–99)
HCT VFR BLD CALC: 38.3 % — SIGNIFICANT CHANGE UP (ref 34.5–45)
HGB BLD-MCNC: 12.5 G/DL — SIGNIFICANT CHANGE UP (ref 11.5–15.5)
MAGNESIUM SERPL-MCNC: 2 MG/DL — SIGNIFICANT CHANGE UP (ref 1.6–2.6)
MCHC RBC-ENTMCNC: 29.2 PG — SIGNIFICANT CHANGE UP (ref 27–34)
MCHC RBC-ENTMCNC: 32.6 GM/DL — SIGNIFICANT CHANGE UP (ref 32–36)
MCV RBC AUTO: 89.5 FL — SIGNIFICANT CHANGE UP (ref 80–100)
NRBC # BLD: 0 /100 WBCS — SIGNIFICANT CHANGE UP (ref 0–0)
PHOSPHATE SERPL-MCNC: 3 MG/DL — SIGNIFICANT CHANGE UP (ref 2.5–4.5)
PLATELET # BLD AUTO: 230 K/UL — SIGNIFICANT CHANGE UP (ref 150–400)
POTASSIUM SERPL-MCNC: 4.2 MMOL/L — SIGNIFICANT CHANGE UP (ref 3.5–5.3)
POTASSIUM SERPL-SCNC: 4.2 MMOL/L — SIGNIFICANT CHANGE UP (ref 3.5–5.3)
RBC # BLD: 4.28 M/UL — SIGNIFICANT CHANGE UP (ref 3.8–5.2)
RBC # FLD: 13.6 % — SIGNIFICANT CHANGE UP (ref 10.3–14.5)
SODIUM SERPL-SCNC: 140 MMOL/L — SIGNIFICANT CHANGE UP (ref 135–145)
WBC # BLD: 12.01 K/UL — HIGH (ref 3.8–10.5)
WBC # FLD AUTO: 12.01 K/UL — HIGH (ref 3.8–10.5)

## 2020-12-12 PROCEDURE — 80048 BASIC METABOLIC PNL TOTAL CA: CPT

## 2020-12-12 PROCEDURE — 76000 FLUOROSCOPY <1 HR PHYS/QHP: CPT

## 2020-12-12 PROCEDURE — 72125 CT NECK SPINE W/O DYE: CPT

## 2020-12-12 PROCEDURE — 84100 ASSAY OF PHOSPHORUS: CPT

## 2020-12-12 PROCEDURE — 82565 ASSAY OF CREATININE: CPT

## 2020-12-12 PROCEDURE — 85027 COMPLETE CBC AUTOMATED: CPT

## 2020-12-12 PROCEDURE — 84295 ASSAY OF SERUM SODIUM: CPT

## 2020-12-12 PROCEDURE — 97116 GAIT TRAINING THERAPY: CPT

## 2020-12-12 PROCEDURE — C9399: CPT

## 2020-12-12 PROCEDURE — C1889: CPT

## 2020-12-12 PROCEDURE — 86850 RBC ANTIBODY SCREEN: CPT

## 2020-12-12 PROCEDURE — 97161 PT EVAL LOW COMPLEX 20 MIN: CPT

## 2020-12-12 PROCEDURE — 94640 AIRWAY INHALATION TREATMENT: CPT

## 2020-12-12 PROCEDURE — 36415 COLL VENOUS BLD VENIPUNCTURE: CPT

## 2020-12-12 PROCEDURE — 82247 BILIRUBIN TOTAL: CPT

## 2020-12-12 PROCEDURE — 86901 BLOOD TYPING SEROLOGIC RH(D): CPT

## 2020-12-12 PROCEDURE — 83735 ASSAY OF MAGNESIUM: CPT

## 2020-12-12 PROCEDURE — C1713: CPT

## 2020-12-12 PROCEDURE — 84702 CHORIONIC GONADOTROPIN TEST: CPT

## 2020-12-12 PROCEDURE — 86900 BLOOD TYPING SEROLOGIC ABO: CPT

## 2020-12-12 PROCEDURE — 85610 PROTHROMBIN TIME: CPT

## 2020-12-12 PROCEDURE — 97164 PT RE-EVAL EST PLAN CARE: CPT

## 2020-12-12 RX ORDER — SENNA PLUS 8.6 MG/1
2 TABLET ORAL
Qty: 30 | Refills: 0
Start: 2020-12-12

## 2020-12-12 RX ORDER — OXYCODONE HYDROCHLORIDE 5 MG/1
1 TABLET ORAL
Qty: 30 | Refills: 0
Start: 2020-12-12 | End: 2020-12-16

## 2020-12-12 RX ORDER — METHOCARBAMOL 500 MG/1
1 TABLET, FILM COATED ORAL
Qty: 15 | Refills: 0
Start: 2020-12-12 | End: 2020-12-16

## 2020-12-12 RX ADMIN — OXYCODONE HYDROCHLORIDE 10 MILLIGRAM(S): 5 TABLET ORAL at 05:27

## 2020-12-12 RX ADMIN — OXYCODONE HYDROCHLORIDE 10 MILLIGRAM(S): 5 TABLET ORAL at 12:16

## 2020-12-12 RX ADMIN — ONDANSETRON 4 MILLIGRAM(S): 8 TABLET, FILM COATED ORAL at 06:02

## 2020-12-12 RX ADMIN — Medication 1000 MILLIGRAM(S): at 13:34

## 2020-12-12 RX ADMIN — Medication 1000 MILLIGRAM(S): at 14:45

## 2020-12-12 RX ADMIN — OXYCODONE HYDROCHLORIDE 10 MILLIGRAM(S): 5 TABLET ORAL at 06:20

## 2020-12-12 RX ADMIN — OXYCODONE HYDROCHLORIDE 10 MILLIGRAM(S): 5 TABLET ORAL at 16:50

## 2020-12-12 RX ADMIN — Medication 1000 MILLIGRAM(S): at 06:01

## 2020-12-12 RX ADMIN — Medication 75 MILLIGRAM(S): at 13:35

## 2020-12-12 RX ADMIN — ONDANSETRON 4 MILLIGRAM(S): 8 TABLET, FILM COATED ORAL at 13:36

## 2020-12-12 RX ADMIN — Medication 75 MILLIGRAM(S): at 06:01

## 2020-12-12 RX ADMIN — METHOCARBAMOL 750 MILLIGRAM(S): 500 TABLET, FILM COATED ORAL at 13:54

## 2020-12-12 RX ADMIN — OXYCODONE HYDROCHLORIDE 10 MILLIGRAM(S): 5 TABLET ORAL at 11:17

## 2020-12-12 RX ADMIN — METHOCARBAMOL 750 MILLIGRAM(S): 500 TABLET, FILM COATED ORAL at 06:01

## 2020-12-12 RX ADMIN — Medication 1000 MILLIGRAM(S): at 07:00

## 2020-12-12 RX ADMIN — ONDANSETRON 4 MILLIGRAM(S): 8 TABLET, FILM COATED ORAL at 13:35

## 2020-12-12 NOTE — DISCHARGE NOTE PROVIDER - NSDCCPTREATMENT_GEN_ALL_CORE_FT
PRINCIPAL PROCEDURE  Procedure: Exploration and fusion, spine, cervical, 2 levels  Findings and Treatment:

## 2020-12-12 NOTE — DISCHARGE NOTE PROVIDER - NSDCCPCAREPLAN_GEN_ALL_CORE_FT
PRINCIPAL DISCHARGE DIAGNOSIS  Diagnosis: Cervical radiculopathy  Assessment and Plan of Treatment:

## 2020-12-12 NOTE — DISCHARGE NOTE PROVIDER - PROVIDER TOKENS
PROVIDER:[TOKEN:[[id number]]],PROVIDER:[TOKEN:[[id number]]] PROVIDER:[TOKEN:[[id number]]],PROVIDER:[TOKEN:[[id number]]],PROVIDER:[TOKEN:[[id number]]] PROVIDER:[TOKEN:[[id number]]],PROVIDER:[TOKEN:[[id number]]],PROVIDER:[TOKEN:[[id number]]] PROVIDER:[TOKEN:[[id number]]],PROVIDER:[TOKEN:[[id number]]],PROVIDER:[TOKEN:[[id number]]],PROVIDER:[TOKEN:[[id number]]]

## 2020-12-12 NOTE — DISCHARGE NOTE PROVIDER - NSDCFUADDINST_GEN_ALL_CORE_FT
1. You may wash lightly with soap and water  2. Pat the wound dry with a clean towel afterwards and leave it open to air. Do not  apply creams or ointments to the wound. Mild swelling around the incision is common.  3. Inform the doctor immediately if you have a fever (above 101), chills, night  sweats, wound drainage, increasing wound redness or pain, nausea, vomiting, or  worsening headache.    B. ACTIVITY LEVEL  1. Fatigue is common following surgery. Listen to your body and rest if you feel tired.  2. You should get up and walk around every hour during the daytime.  3. No heavy lifting, twisting   4. Drink plenty of water.   Pain Medications: You may be given a narcotic pain reliever such as Percocet  (oxycodone-acetaminophen). These are for use only for a short time after  surgery. They may cause drowsiness, nausea, and constipation. Take after food   and drink plenty of water to help reduce side effects. Do not drink alcohol with  these medications.

## 2020-12-12 NOTE — DISCHARGE NOTE PROVIDER - CARE PROVIDER_API CALL
Kelvin Garrido)  Neurosurgery  [address] [place] [address]  Phone: [phone]  Fax: [phone]  Follow Up Time:     Regino Sanchez)  Physician Assistant Services  [address] [place] [address]  Phone: [phone]  Fax: [phone]  Follow Up Time:    Kelvin Garrido)  Neurosurgery  [address] [place] [address]  Phone: [phone]  Fax: [phone]  Follow Up Time:     Regino SanchezPA)  Physician Assistant Services  [address] [place] [address]  Phone: [phone]  Fax: [phone]  Follow Up Time:     Nahid Hannon  ANESTHESIOLOGY  [address] [place] [address]  Phone: [phone]  Fax: [phone]  Follow Up Time:    Kelvin Garrido)  Neurosurgery  [address] [place] [address]  Phone: [phone]  Fax: [phone]  Follow Up Time:     Regino SanchezPA)  Physician Assistant Services  [address] [place] [address]  Phone: [phone]  Fax: [phone]  Follow Up Time:     Thor Terry  [id number]  [address] [place] [address]  Phone: [phone]  Fax: [phone]  Follow Up Time:    Kelvin Garrido)  Neurosurgery  [address] [place] [address]  Phone: [phone]  Fax: [phone]  Follow Up Time:     Regino SanchezPA)  Physician Assistant Services  [address] [place] [address]  Phone: [phone]  Fax: [phone]  Follow Up Time:     Thor Terry  [id number]  [address] [place] [address]  Phone: [phone]  Fax: ()-  Follow Up Time:     Salazar Fonseca  PLASTIC SURGERY  [address] [place] [address]  Phone: [phone]  Fax: [phone]  Follow Up Time:

## 2020-12-12 NOTE — PROGRESS NOTE ADULT - ATTENDING COMMENTS
incisional pain posterior moderate  tolerating full diet  has been ambulating  sitting up in chair  raises both arms overhead    anticipate DC home today, discussed anticipated recovery process, f/u with Dr. Fonseca for wound/drains/dressings this coming week, f/u in my office the week after

## 2020-12-12 NOTE — DISCHARGE NOTE PROVIDER - HOSPITAL COURSE
HPI:  Patient presents with chronic neck pain that causes headache and migraine type symptoms, pain in both shoulder blades and then pain radiating through the left shoulder arm and down the hand with sense of weakness in left hand, history of C5-6 ACDF and C4-5 acdf 2017.    Update: 12/9/2020.     patient complains of persistent increased left arm pain s/p C6-T1 ACDF, C3-4 Laminectomy, C3-T1 Posterior Fusion 12/7/20. It has been decided that she will be brought back to OR for exploration of fusion and reinstrumentation. (07 Dec 2020 16:33)  HPI:  Patient presents with chronic neck pain that causes headache and migraine type symptoms, pain in both shoulder blades and then pain radiating through the left shoulder arm and down the hand with sense of weakness in left hand, history of C5-6 ACDF and C4-5 acdf 2017. (07 Dec 2020 16:33)    OVERNIGHT EVENTS:    Hospital Course:  12/7: POD# 0 S/P C6-T1 ACDF, C3-4 Laminectomy, C3-T1 Posterior fusion. Complaints of left shoulder/arm pain with paresthesias.  12/8: POD#1: +[place] J collar. Pain Mgmt consult. PT evaluation. [place] J in place. Dr. Hannon following for pain. CT C-spine for LUE pain/weakness - long screw at C6-7   12/9: POD#2/ POD#0 s/p Exploration and revisional posterior instrumentation of cervical spine with plastic surgery closure (Dr. Fonseca),   12/10: POD#3/ POD#1 PAULA overnight, neuro exam stable, LUE radicular pain present but improved from preop, on PCA  12/11: POD#4/ POD#2 PAULA overnight, neuro exam stable  12/12: POD #5/POD#3 PAULA overnight. Neuro exam stable. Plan for discharge home today.

## 2020-12-12 NOTE — PROGRESS NOTE ADULT - SUBJECTIVE AND OBJECTIVE BOX
HPI:  Patient presents with chronic neck pain that causes headache and migraine type symptoms, pain in both shoulder blades and then pain radiating through the left shoulder arm and down the hand with sense of weakness in left hand, history of C5-6 ACDF and C4-5 acdf 2017.    Update: 12/9/2020.     patient complains of persistent increased left arm pain s/p C6-T1 ACDF, C3-4 Laminectomy, C3-T1 Posterior Fusion 12/7/20. It has been decided that she will be brought back to OR for exploration of fusion and reinstrumentation. (07 Dec 2020 16:33)  HPI:  Patient presents with chronic neck pain that causes headache and migraine type symptoms, pain in both shoulder blades and then pain radiating through the left shoulder arm and down the hand with sense of weakness in left hand, history of C5-6 ACDF and C4-5 acdf 2017. (07 Dec 2020 16:33)    OVERNIGHT EVENTS:    Hospital Course:  12/7: POD# 0 S/P C6-T1 ACDF, C3-4 Laminectomy, C3-T1 Posterior fusion. Complaints of left shoulder/arm pain with paresthesias.  12/8: POD#1: +[place] J collar. Pain Mgmt consult. PT evaluation. [place] J in place. Dr. Hannon following for pain. CT C-spine for LUE pain/weakness - long screw at C6-7   12/9: POD#2/ POD#0 s/p Exploration and revisional posterior instrumentation of cervical spine with plastic surgery closure (Dr. Fonseca),   12/10: POD#3/ POD#1 PAULA overnight, neuro exam stable, LUE radicular pain present but improved from preop, on PCA  12/11: POD#4/ POD#2 PAULA overnight, neuro exam stable  12/12: POD #5/POD#3 PAULA overnight. Neuro exam stable. Plan for discharge home today.      Vital Signs Last 24 Hrs  T(C): 37.2 (12 Dec 2020 08:37), Max: 37.2 (12 Dec 2020 08:37)  T(F): 98.9 (12 Dec 2020 08:37), Max: 98.9 (12 Dec 2020 08:37)  HR: 85 (12 Dec 2020 08:37) (78 - 85)  BP: 126/76 (12 Dec 2020 08:37) (126/76 - 154/92)  BP(mean): --  RR: 18 (12 Dec 2020 08:37) (12 - 18)  SpO2: 97% (12 Dec 2020 08:37) (96% - 98%)    I&O's Detail    11 Dec 2020 07:01  -  12 Dec 2020 07:00  --------------------------------------------------------  IN:    Oral Fluid: 880 mL  Total IN: 880 mL    OUT:    Bulb (mL): 16 mL    Voided (mL): 1850 mL  Total OUT: 1866 mL    Total NET: -986 mL      12 Dec 2020 07:01  -  12 Dec 2020 12:33  --------------------------------------------------------  IN:    Oral Fluid: 550 mL  Total IN: 550 mL    OUT:    Voided (mL): 300 mL  Total OUT: 300 mL    Total NET: 250 mL        I&O's Summary    11 Dec 2020 07:01  -  12 Dec 2020 07:00  --------------------------------------------------------  IN: 880 mL / OUT: 1866 mL / NET: -986 mL    12 Dec 2020 07:01  -  12 Dec 2020 12:33  --------------------------------------------------------  IN: 550 mL / OUT: 300 mL / NET: 250 mL        PHYSICAL EXAM:  General: patient seen sitting upright in bed in [facility]  Neuro: AAOx3, FC, OE spontaneously, speech clear and fluent, face symmetric, no pronator drift, finger to nose intact, strength 5/5 b/l LE and RUE, LUE shoulder extension/deltoid 4/5 o/w 5/5, SILT throughout  HEENT: PERRL, EOMI  Neck: supple, +[place] J collar in place  Cardiac: RRR, S1S2  Pulmonary: chest rise symmetric  Abdomen: soft, nontender, nondistended  Ext: warm, perfusing well  Wound: anterior cervical incision c/d/i, posterior cervical incision covered by prevena dressing    TUBES/LINES:  [] CVC  [] A-line  [] Lumbar Drain  [] Ventriculostomy  [] Other    DIET:  [] NPO  [] Mechanical  [] Tube feeds    LABS:                        12.5   12.01 )-----------( 230      ( 12 Dec 2020 06:46 )             38.3     12-12    140  |  102  |  9   ----------------------------<  107<H>  4.2   |  28  |  0.63    Ca    8.8      12 Dec 2020 06:46  Phos  3.0     12-12  Mg     2.0     12-12              CAPILLARY BLOOD GLUCOSE          Drug Levels: [] N/A    CSF Analysis: [] N/A      Allergies    niacin (Hives)    Intolerances      MEDICATIONS:  Antibiotics:    Neuro:  acetaminophen   Tablet .. 1000 milliGRAM(s) Oral every 8 hours  methocarbamol 750 milliGRAM(s) Oral every 8 hours  metoclopramide Injectable 10 milliGRAM(s) IV Push every 8 hours PRN  ondansetron Injectable 4 milliGRAM(s) IV Push every 6 hours PRN  ondansetron Injectable 4 milliGRAM(s) IV Push every 6 hours  oxyCODONE    IR 5 milliGRAM(s) Oral every 4 hours PRN  oxyCODONE    IR 10 milliGRAM(s) Oral every 4 hours PRN  pregabalin 75 milliGRAM(s) Oral every 8 hours    Anticoagulation:  enoxaparin Injectable 40 milliGRAM(s) SubCutaneous at bedtime    OTHER:  naloxone Injectable 0.1 milliGRAM(s) IV Push every 3 minutes PRN  senna 2 Tablet(s) Oral at bedtime    IVF:    CULTURES:    RADIOLOGY & ADDITIONAL TESTS:      ASSESSMENT:  49 y/o female POD 5 s/p C6-T1 ACDF, C3-4 Laminectomy, C3-T1 Posterior fusion (12/7), post-op CT  demonstrating screw with increased length at C6-7.  Now POD 3 from exploration and revisional posterior instrumentation of cervical spine (12/9).      : CERVICALGIA    Handoff    MEWS Score    Asthma    Cervical radiculopathy    Back pain    HLD (hyperlipidemia)    HTN (hypertension)    Cervical radiculopathy    Exploration and fusion, spine, cervical, 2 levels    Laminectomy, spine, cervical, open    Fusion of cervical spine by combined anterior and posterior approaches    Anterior cervical discectomy and fusion (ACDF)    Surgery, elective    [email or web address]        PLAN:  - pain control as needed  - neuro and vital checks  - JAYNE and HMV  managed by Plastic Surgery, Dr. Fonseca: HMV removed, 1 JAYNE remains pt will f/u with plastics for removal  - regular diet  - bowel regimen  - SCDs for DVT ppx  - telemetry status, full code  -Plan for discharge home today    Plan d/w Dr. Garrido    Assessment:  Present when checked    []  GCS  E   V  M     Heart Failure: []Acute, [] acute on chronic , []chronic  Heart Failure:  [] Diastolic (HFpEF), [] Systolic (HFrEF), []Combined (HFpEF and HFrEF), [] RHF, [] Pulm HTN, [] Other    [] CASTRO, [] ATN, [] AIN, [] other  [] CKD1, [] CKD2, [] CKD 3, [] CKD 4, [] CKD 5, []ESRD    Encephalopathy: [] Metabolic, [] Hepatic, [] toxic, [] Neurological, [] Other    Abnormal Nurtitional Status: [] malnurtition (see nutrition note), [ ]underweight: BMI < 19, [] morbid obesity: BMI >40, [] Cachexia    [] Sepsis  [] hypovolemic shock,[] cardiogenic shock, [] hemorrhagic shock, [] neuogenic shock  [] Acute Respiratory Failure  []Cerebral edema, [] Brain compression/ herniation,   [] Functional quadriplegia  [] Acute blood loss anemia

## 2020-12-12 NOTE — PROGRESS NOTE ADULT - REASON FOR ADMISSION
Elective C6-T1 ACDF, C3-4 Laminectomy, C3-T1 Posterior Fusion.

## 2020-12-12 NOTE — DISCHARGE NOTE NURSING/CASE MANAGEMENT/SOCIAL WORK - PATIENT PORTAL LINK FT
You can access the FollowMyHealth Patient Portal offered by [facility] by registering at the following website: [email or web address]. By joining [facility]’s FollowMyHealth portal, you will also be able to view your health information using other applications (apps) compatible with our system.

## 2020-12-12 NOTE — DISCHARGE NOTE PROVIDER - NSDCMRMEDTOKEN_GEN_ALL_CORE_FT
Advair Diskus 250 mcg-50 mcg inhalation powder: 1 puff(s) inhaled 2 times a day, As Needed  amLODIPine 5 mg oral tablet: 1 tab(s) orally once a day  Crestor 5 mg oral tablet: 1 tab(s) orally once a day  Ventolin HFA 90 mcg/inh inhalation aerosol: 2 puff(s) inhaled every 6 hours, As Needed   Advair Diskus 250 mcg-50 mcg inhalation powder: 1 puff(s) inhaled 2 times a day, As Needed  amLODIPine 5 mg oral tablet: 1 tab(s) orally once a day  Crestor 5 mg oral tablet: 1 tab(s) orally once a day  Innerclean oral tablet: 2 tab(s) orally once a day (at bedtime)  Lyrica 75 mg oral capsule: 1 cap(s) orally every 8 hours MDD:MDD 3  Robaxin-750 oral tablet: 1 tab(s) orally every 8 hours as needed for muscle spasms   Roxicodone 5 mg oral tablet: 1 tab(s) orally every 4-6 hours, As needed, Moderate Pain (4 - 6). MDD 6 MDD:MDD 6  Ventolin HFA 90 mcg/inh inhalation aerosol: 2 puff(s) inhaled every 6 hours, As Needed

## 2020-12-12 NOTE — DISCHARGE NOTE PROVIDER - NSDCFUADDAPPT_GEN_ALL_CORE_FT
You will follow-up with KEISHA Simon on the date their office has provided to you. You may also follow-up with pain management as we discussed on discharge. You will follow-up with KEISHA Simon on the date their office has provided to you. You should also follow-up with Dr. Nahid Hannon, Pain management You will follow-up with KEISHA Simon on the date their office has provided to you. You should also follow-up with Dr. Rodolfo Terry, Pain management You will follow-up with KEISHA Simon on the date their office has provided to you. Dr. Fonseca, Plastic Surgery and you should also follow-up with Dr. Rodolfo Terry, Pain management

## 2020-12-18 DIAGNOSIS — M54.12 RADICULOPATHY, CERVICAL REGION: ICD-10-CM

## 2020-12-18 DIAGNOSIS — T84.296A OTHER MECHANICAL COMPLICATION OF INTERNAL FIXATION DEVICE OF VERTEBRAE, INITIAL ENCOUNTER: ICD-10-CM
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
## 2020-12-18 DIAGNOSIS — J45.909 UNSPECIFIED ASTHMA, UNCOMPLICATED: ICD-10-CM

## 2020-12-18 DIAGNOSIS — M43.02 SPONDYLOLYSIS, CERVICAL REGION: ICD-10-CM

## 2020-12-18 DIAGNOSIS — M96.0 PSEUDARTHROSIS AFTER FUSION OR ARTHRODESIS: ICD-10-CM

## 2020-12-18 DIAGNOSIS — I10 ESSENTIAL (PRIMARY) HYPERTENSION: ICD-10-CM

## 2020-12-18 DIAGNOSIS — Z88.8 ALLERGY STATUS TO OTHER DRUGS, MEDICAMENTS AND BIOLOGICAL SUBSTANCES: ICD-10-CM

## 2020-12-18 DIAGNOSIS — E66.09 OTHER OBESITY DUE TO EXCESS CALORIES: ICD-10-CM

## 2020-12-18 DIAGNOSIS — M48.02 SPINAL STENOSIS, CERVICAL REGION: ICD-10-CM

## 2020-12-18 DIAGNOSIS — E78.5 HYPERLIPIDEMIA, UNSPECIFIED: ICD-10-CM

## 2022-06-14 NOTE — DISCHARGE NOTE NURSING/CASE MANAGEMENT/SOCIAL WORK - CAREGIVER PHONE NUMBER
[facility] APN SUBSEQUENT VISIT NOTE  Virtual Encounter    Primary Care Provider:  Office Phone #: [phone]  Fax Phone #: [phone]  Medical Director: Jaquan Eaton MD    Chief Complaint   Patient presents with   • APN Video Visit   • Extremity Weakness   • Diabetes   • Blood Pressure     Pt is located at his home in [place], [place]. Virtual encounter requested and facilitated by pt's daughter Nimco. Requesting full assessment of pt for completion of FMLA papers. Concerned that pt condition has deteriorated to the point that a family member needs to be with him more often.     Pt is a 89-year-old AA homebound male with PMH of Type 2 diabetes, BPH,  Thromboembolism, hard of hearing, Gout, CKD stage 2, atherosclerosis of the aorta, and Hypercholesterolemia.     Requires some assistance with ADLs/IADLs,   Continent of bowel and bladder,   Appetite fair, Denies any falls or any recent hospitalization.  Medication reconciliation done, no medication refill needed at this time.      Patient's medications, allergies, past medical, surgical, social and family histories were reviewed and updated as appropriate.  History Obtained By: HX Obtained by: Patient      ADVANCE DIRECTIVES:  Power of  Status:No ACP documents on file  Code Status:  FULL CODE      Review of Systems   HENT: Positive for hearing loss.    Respiratory: Negative.    Cardiovascular: Negative.    Gastrointestinal: Negative.    Endocrine: Negative.    Musculoskeletal: Positive for arthralgias and gait problem.   Skin: Negative.    Allergic/Immunologic: Negative.    Hematological: Negative.    Psychiatric/Behavioral: Negative.      Current Outpatient Medications   Medication Sig Dispense Refill   • potassium chloride (KLOR-CON) 10 MEQ ER tablet TAKE 1 TABLET BY MOUTH DAILY 90 tablet 3   • furosemide (LASIX) 40 MG tablet TAKE 1 TABLET BY MOUTH DAILY 90 tablet 3   • allopurinol (ZYLOPRIM) 300 MG tablet TAKE 1 TABLET BY MOUTH DAILY 90  tablet 3   • atorvastatin (LIPITOR) 20 MG tablet TAKE 1 TABLET BY MOUTH DAILY 90 tablet 3   • cholecalciferol (VITAMIN D) 25 mcg (1,000 units) tablet Take 1,000 Units by mouth daily.     • colchicine 0.6 MG capsule Take 1 capsule by mouth 2 times daily. 180 capsule 3   • acetaminophen (TYLENOL) 500 MG tablet Take 500 mg by mouth every 6 hours as needed. As needed for pain     • aspirin 81 MG chewable tablet Chew 81 mg by mouth daily.       No current facility-administered medications for this visit.     ALLERGIES:   Allergen Reactions   • No Known Allergies Other (See Comments)     Past Medical History:   Diagnosis Date   • Anemia 5/5/2017   • Chronic kidney disease (CKD), stage II (mild) 8/3/2017   • Chronic kidney disease (CKD), stage III (moderate) (CMS/[facility]) 3/17/2020   • Left inguinal hernia 10/30/2012   • Thromboembolism (CMS/[facility]) 9/29/2019 2017 The patient underwent a percutaneous AAA repair [facility] 3 weeks ago and this was followed by a stay at [facility] for a left leg embolectomy postop.  Not chronic and had treatment as  embolectomy        No past surgical history on file.  Social History     Tobacco Use   Smoking Status Never Smoker   Smokeless Tobacco Never Used     Social History     Substance and Sexual Activity   Alcohol Use Not Currently     Social History     Substance and Sexual Activity   Drug Use Never         Pain Assessment:  Comfort/Function Pain Goal 0  Walker  Palliative Performance Scale: 60%  Walker  Good     Physical Exam  Constitutional:       General: He is not in acute distress.     Appearance: Normal appearance. He is not ill-appearing or toxic-appearing.   HENT:      Head: Normocephalic and atraumatic.      Mouth/Throat:      Mouth: Mucous membranes are moist.   Eyes:      Pupils: Pupils are equal, round, and reactive to light.   Pulmonary:      Effort: Pulmonary effort is normal.   Abdominal:      General: Abdomen is flat. There is no distension.    Musculoskeletal:         General: Normal range of motion.      Cervical back: Normal range of motion.   Skin:     General: Skin is dry.   Neurological:      Mental Status: He is alert and oriented to person, place, and time. Mental status is at baseline.   Psychiatric:         Mood and Affect: Mood normal.         Behavior: Behavior normal.       Prognosis: fair  High risk of death within one year? no  Basis for estimate:chart review and clinical assessment  End of Life Decisions were reviewed with the Pt/family: no  Planned Discharge Disposition: Home bound.       ASSESSMENT/PLAN:  Type 2 DM with CKD stage 3 and hypertension (CMS/[facility])  Last A1c 5.8%  Renal condition is stable, GFR 67ml/min  Dietary sodium restriction.  avoid nephrotoxic medications -  Like Nonsteroidal anti-inflammatory drugs  (Ibuprofen and naproxen).   Hypertension is stable with treatment.  Optho and podiatry referrals offered for DM.  Continue current treatment regimen.  Check current labs at next RN visit    Vitamin D deficiency  Cont current treatment  Cont Vitamin D 50000-unit wk   Encouraged to get sunlight   Diet rich in Vitamin D  Fall prevention  Will check levels at next RN visit     BPH/Enlarged prostate with lower urinary tract symptoms (LUTS)  Asymptomatic on current meds-  Voids freely  Cont. Flomax, Finasteride  Push fluids/ low-sugar cranberry juice  avoid OTC antihistamine  Will cont to monitor    Debility  Safety/fall precautions reviewed  Pt has good family support    Vascular dementia- mild  Fast scale score 3-4  Asa & statin  Pt has strong family support  Continue to monitor    PVD  Continue ASA & statin  Podiatry referral    ACP  Advance Care Planning/Goals of Care:    Stabilization to prolongation of life.       FMLA papers completed documenting above and returned to pt's daughter Nimco via email, per pt request.     Medical compliance with plan discussed and risks of non-compliance reviewed.    Patient education  completed on disease process, etiology & prognosis.    Patient expresses understanding of the plan.    Proper usage and side effects of medications reviewed & discussed.      APN instructed patient to call [facility] with any medical issues or concerns.  Verbalized understanding.  Total combined time for today's virtual/video encounter was 30 minutes, with > 50% of that time spent counseling and coordinating care regarding the above.              /
